# Patient Record
Sex: FEMALE | Race: OTHER
[De-identification: names, ages, dates, MRNs, and addresses within clinical notes are randomized per-mention and may not be internally consistent; named-entity substitution may affect disease eponyms.]

---

## 2019-11-06 ENCOUNTER — HOSPITAL ENCOUNTER (EMERGENCY)
Dept: HOSPITAL 56 - MW.ED | Age: 69
Discharge: HOME | End: 2019-11-06
Payer: MEDICARE
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
## 2019-11-06 DIAGNOSIS — Z79.899: ICD-10-CM

## 2019-11-06 DIAGNOSIS — M24.412: Primary | ICD-10-CM

## 2019-11-06 PROCEDURE — 73030 X-RAY EXAM OF SHOULDER: CPT

## 2019-11-06 PROCEDURE — 23650 CLTX SHO DSLC W/MNPJ WO ANES: CPT

## 2019-11-06 PROCEDURE — 99152 MOD SED SAME PHYS/QHP 5/>YRS: CPT

## 2019-11-06 PROCEDURE — 96372 THER/PROPH/DIAG INJ SC/IM: CPT

## 2019-11-06 PROCEDURE — 99284 EMERGENCY DEPT VISIT MOD MDM: CPT

## 2019-11-06 NOTE — CR
Indication:



Post reduction 



Technique:



Two views of the left shoulder were obtained.



Comparison:



November 6, 2019.



Findings:



The current studies dated 1748 hours.



The humeral head is seated within the glenoid. Degenerative changes are 

identified. No fracture is identified.



Impression:



Post reduction of the left shoulder dislocation.



Dictated by Clarisse Osullivan MD @ Nov 6 2019  6:03PM



Signed by Dr. Clarisse Osullivan @ Nov 6 2019  6:03PM

## 2019-11-06 NOTE — EDM.PDOC
ED HPI GENERAL MEDICAL PROBLEM





- General


Chief Complaint: Upper Extremity Injury/Pain


Stated Complaint: DISLOCATED SHOULDER


Time Seen by Provider: 11/06/19 15:12


Source of Information: Reports: Patient


History Limitations: Reports: No Limitations





- History of Present Illness


INITIAL COMMENTS - FREE TEXT/NARRATIVE: 


History of present illness:


[]patient has dislocated her left shoulder 7 times in the last 5 years. She has 

not followed up with orthopedic surgeon. She moved here from [place] and  was 

reaching for an object today and dislocated prior to arrival.





Review of systems: 


As per history of present illness and below otherwise all systems reviewed and 

negative.





Past medical history: 


As per history of present illness and as reviewed below otherwise 

noncontributory.





Surgical history: 


As per history of present illness and as reviewed below otherwise 

noncontributory.





Social history: 


No reported history of drug or alcohol abuse.





Family history: 


As per history of present illness and as reviewed below otherwise 

noncontributory.





Physical exam:


General: Well developed, well nourished in NAD


HEENT: Atraumatic, normocephalic, pupils reactive, negative for conjunctival 

pallor or scleral icterus, mucous membranes moist, throat clear, neck supple, 

nontender, trachea midline.


Lungs: Clear to auscultation, breath sounds equal bilaterally, chest nontender.


Heart: S1S2, regular, negative for clicks, rubs, or JVD.


Abdomen: NABS, Soft, nondistended, nontender. Negative for masses or 

hepatosplenomegaly. Negative for costovertebral tenderness.


Pelvis: Stable nontender.


Genitourinary: Deferred.


Rectal: Deferred.


Extremities: left shoulder tenderness,NVI, negative for cords or calf pain. 

Neurovascular unremarkable.


Neuro: Awake, alert, oriented. Cranial nerves II through XII unremarkable. 

Cerebellum unremarkable. Motor and sensory unremarkable throughout. Exam 

nonfocal.


Skin:warm and dry





Diagnostics:


x-ray-anterior dislocation left shoulder





Therapeutics:


dilaudid, valium, conscious sedation





ED Course:


stable





Impression: 


recurrent anterior shoulder dislocation left





Prescriptions:





Plan:


Take meds as directed, follow up with your primary care physician, return to ER 

if symptoms worsen or change.





Definitive disposition and diagnosis as appropriate pending reevaluation and 

review of above.





  ** left shoulder


Pain Score (Numeric/FACES): 9





- Related Data


 Allergies











Allergy/AdvReac Type Severity Reaction Status Date / Time


 


No Known Allergies Allergy   Verified 11/06/19 15:25











Home Meds: 


 Home Meds





Acetaminophen/Codeine [Tylenol with Codeine No.3 300MG/30MG] 1 tab PO Q4H PRN 11 /06/19 [History]


Diclofenac Sodium [Voltaren] 75 mg PO BIDMEALS PRN #20 tab.cr 11/06/19 [Rx]


Gabapentin [Neurontin] 300 mg PO BID 11/06/19 [History]


Levothyroxine [Synthroid] 88 mcg PO ACBREAKFAST 11/06/19 [History]


Omeprazole 40 mg PO DAILY 11/06/19 [History]


Sucralfate 1 gm PO BEDTIME 11/06/19 [History]


carBAMazepine [Carbamazepine ER] 200 mg PO BID 11/06/19 [History]











Review of Systems





- Review of Systems


Review Of Systems: See Below





ED EXAM, GENERAL





- Physical Exam


Exam: See Below





Course





- Vital Signs


Last Recorded V/S: 


 Last Vital Signs











Temp  98.0 F   11/06/19 15:10


 


Pulse  100   11/06/19 15:10


 


Resp  18   11/06/19 15:10


 


BP  170/89 H  11/06/19 15:10


 


Pulse Ox  99   11/06/19 15:10














- Orders/Labs/Meds


Orders: 


 Active Orders 24 hr











 Category Date Time Status


 


 Sodium Chloride 0.9% [Saline Flush] Med  11/06/19 16:00 Active





 10 ml FLUSH ASDIRECTED PRN   


 


 Sodium Chloride 0.9% [Saline Flush] Med  11/06/19 16:00 Active





 2.5 ml FLUSH ASDIRECTED PRN   


 


 Saline Lock Insert [OM.PC] Stat Oth  11/06/19 16:00 Ordered








 Medication Orders





Sodium Chloride (Saline Flush)  10 ml FLUSH ASDIRECTED PRN


   PRN Reason: Keep Vein Open


Sodium Chloride (Saline Flush)  2.5 ml FLUSH ASDIRECTED PRN


   PRN Reason: Keep Vein Open








Meds: 


Medications











Generic Name Dose Route Start Last Admin





  Trade Name Freq  PRN Reason Stop Dose Admin


 


Sodium Chloride  10 ml  11/06/19 16:00  





  Saline Flush  FLUSH   





  ASDIRECTED PRN   





  Keep Vein Open   





     





     





     


 


Sodium Chloride  2.5 ml  11/06/19 16:00  





  Saline Flush  FLUSH   





  ASDIRECTED PRN   





  Keep Vein Open   





     





     





     














Discontinued Medications














Generic Name Dose Route Start Last Admin





  Trade Name Freq  PRN Reason Stop Dose Admin


 


Diazepam  5 mg  11/06/19 15:13  11/06/19 15:27





  Valium.  PO  11/06/19 15:14  5 mg





  ONETIME ONE   Administration





     





     





     





     


 


Hydromorphone HCl  1 mg  11/06/19 15:13  11/06/19 15:26





  Dilaudid  IM  11/06/19 15:14  1 mg





  ONETIME ONE   Administration





     





     





     





     


 


Midazolam HCl  Confirm  11/06/19 16:25  





  Versed 1 Mg/Ml  Administered  11/06/19 16:26  





  Dose   





  2 mg   





  .ROUTE   





  .STK-MED ONE   





     





     





     





     


 


Ondansetron HCl  4 mg  11/06/19 15:14  11/06/19 15:27





  Zofran Odt  PO  11/06/19 15:15  4 mg





  ONETIME ONE   Administration





     





     





     





     


 


Propofol  Confirm  11/06/19 16:25  





  Diprivan  20 Ml  Administered  11/06/19 16:26  





  Dose   





  200 mg   





  .ROUTE   





  .STK-MED ONE   





     





     





     





     














Departure





- Departure


Time of Disposition: 18:21


Disposition: Home, Self-Care 01


Condition: Good


Clinical Impression: 


 Recurrent dislocation, left shoulder








- Discharge Information


*PRESCRIPTION DRUG MONITORING PROGRAM REVIEWED*: Not Applicable


*COPY OF PRESCRIPTION DRUG MONITORING REPORT IN PATIENT LUCY: Not Applicable


Referrals: 


PCP,None [Primary Care Provider] - 


Forms:  ED Department Discharge


Additional Instructions: 


The following information is given to patients seen in the emergency department 

who are being discharged to home. This information is to outline your options 

for follow-up care. We provide all patients seen in our emergency department 

with a follow-up referral.





The need for follow-up, as well as the timing and circumstances, are variable 

depending upon the specifics of your emergency department visit.





If you don't have a primary care physician on staff, we will provide you with a 

referral. We always advise you to contact your personal physician following an 

emergency department visit to inform them of the circumstance of the visit and 

for follow-up with them and/or the need for any referrals to a consulting 

specialist.





The emergency department will also refer you to a specialist when appropriate. 

This referral assures that you have the opportunity for follow-up care with a 

specialist. All of these measure are taken in an effort to provide you with 

optimal care, which includes your follow-up.





Under all circumstances we always encourage you to contact your private 

physician who remains a resource for coordinating your care. When calling for 

follow-up care, please make the office aware that this follow-up is from your 

recent emergency room visit. If for any reason you are refused follow-up, 

please contact the [facility] Emergency 

Department at [phone] and asked to speak to the emergency department 

charge nurse.





Take meds as directed, follow up with your primary care physician, return to ER 

if symptoms worsen or change.





[facility]


Primary Care


[address] [place] [id number]


Phone: [phone]


Fax: [phone]





- My Orders


Last 24 Hours: 


My Active Orders





11/06/19 16:00


Sodium Chloride 0.9% [Saline Flush]   10 ml FLUSH ASDIRECTED PRN 


Sodium Chloride 0.9% [Saline Flush]   2.5 ml FLUSH ASDIRECTED PRN 


Saline Lock Insert [OM.PC] Stat 














- Assessment/Plan


Last 24 Hours: 


My Active Orders





11/06/19 16:00


Sodium Chloride 0.9% [Saline Flush]   10 ml FLUSH ASDIRECTED PRN 


Sodium Chloride 0.9% [Saline Flush]   2.5 ml FLUSH ASDIRECTED PRN 


Saline Lock Insert [OM.PC] Stat

## 2019-11-06 NOTE — CR
EXAM DATE: 11/06/19



PATIENT'S AGE: 68





Left shoulder: Three views left shoulder were obtained.



Comparison: No previous study.



Anterior dislocation is seen.  Cystic change is noted within the glenoid.  No 
additional abnormality is seen other than osteopenia.



Impression:

1.  Dislocated left shoulder.

2.  Mild degenerative change within the glenoid.



Diagnostic code #3



Report Signed by Proxy.



HUMZA

## 2019-11-11 NOTE — PCM.PRNOTE
- Free Text/Narrative


Note: 





Anes Note





Late Entry.





Patient is in ER with a dislocated right shoulder.





Patient sedated with 30 mg propofol and 1 mg versed.





Conscious sedation achieved.





reduction tolerated well. 





Awake after procedure.





VS Stable.





Time with patient [id number]





November 6 2019

## 2022-01-15 ENCOUNTER — HOSPITAL ENCOUNTER (EMERGENCY)
Dept: HOSPITAL 90 - EDH | Age: 72
Discharge: HOME | End: 2022-01-15
Payer: COMMERCIAL

## 2022-01-15 VITALS — BODY MASS INDEX: 39.83 KG/M2 | HEIGHT: 61 IN | WEIGHT: 210.98 LBS

## 2022-01-15 VITALS — SYSTOLIC BLOOD PRESSURE: 123 MMHG | DIASTOLIC BLOOD PRESSURE: 79 MMHG

## 2022-01-15 DIAGNOSIS — Z90.49: ICD-10-CM

## 2022-01-15 DIAGNOSIS — I10: ICD-10-CM

## 2022-01-15 DIAGNOSIS — E11.9: ICD-10-CM

## 2022-01-15 DIAGNOSIS — Z98.890: ICD-10-CM

## 2022-01-15 DIAGNOSIS — K52.9: Primary | ICD-10-CM

## 2022-01-15 LAB
ALBUMIN SERPL-MCNC: 3.7 G/DL (ref 3.5–5)
ALT SERPL-CCNC: 24 U/L (ref 12–78)
AST SERPL-CCNC: 13 U/L (ref 10–37)
BASOPHILS NFR BLD AUTO: 0.1 % (ref 0–5)
BILIRUB SERPL-MCNC: 0.6 MG/DL (ref 0.2–1)
BUN SERPL-MCNC: 17 MG/DL (ref 7–18)
CHLORIDE SERPL-SCNC: 104 MMOL/L (ref 101–111)
CO2 SERPL-SCNC: 29 MMOL/L (ref 21–32)
CREAT SERPL-MCNC: 0.6 MG/DL (ref 0.5–1.5)
EOSINOPHIL NFR BLD AUTO: 0 % (ref 0–8)
ERYTHROCYTE [DISTWIDTH] IN BLOOD BY AUTOMATED COUNT: 14.1 % (ref 11–15.5)
GFR SERPL CREATININE-BSD FRML MDRD: 105 ML/MIN (ref 60–?)
GLUCOSE SERPL-MCNC: 112 MG/DL (ref 70–105)
HCT VFR BLD AUTO: 41.7 % (ref 36–48)
LIPASE SERPL-CCNC: 40 U/L (ref 114–286)
LYMPHOCYTES NFR SPEC AUTO: 15.7 % (ref 21–51)
MCH RBC QN AUTO: 29.4 PG (ref 27–33)
MCHC RBC AUTO-ENTMCNC: 32.6 G/DL (ref 32–36)
MCV RBC AUTO: 90.3 FL (ref 79–99)
MONOCYTES NFR BLD AUTO: 5.3 % (ref 3–13)
NEUTROPHILS NFR BLD AUTO: 78.5 % (ref 40–77)
NRBC BLD MANUAL-RTO: 0 % (ref 0–0.19)
PH UR STRIP: 6.5 [PH] (ref 5–8)
PLATELET # BLD AUTO: 343 K/UL (ref 130–400)
POTASSIUM SERPL-SCNC: 4.2 MMOL/L (ref 3.5–5.1)
PROT SERPL-MCNC: 7.8 G/DL (ref 6–8.3)
RBC # BLD AUTO: 4.62 MIL/UL (ref 4–5.5)
RBC #/AREA URNS HPF: (no result) /HPF (ref 0–1)
SODIUM SERPL-SCNC: 141 MMOL/L (ref 136–145)
SP GR UR STRIP: 1.01 (ref 1–1.03)
UROBILINOGEN UR STRIP-MCNC: 1 MG/DL (ref 0.2–1)
WBC # BLD AUTO: 13.9 K/UL (ref 4.8–10.8)
WBC #/AREA URNS HPF: (no result) /HPF (ref 0–1)

## 2022-01-15 PROCEDURE — 81001 URINALYSIS AUTO W/SCOPE: CPT

## 2022-01-15 PROCEDURE — 80053 COMPREHEN METABOLIC PANEL: CPT

## 2022-01-15 PROCEDURE — 36415 COLL VENOUS BLD VENIPUNCTURE: CPT

## 2022-01-15 PROCEDURE — 83690 ASSAY OF LIPASE: CPT

## 2022-01-15 PROCEDURE — 85025 COMPLETE CBC W/AUTO DIFF WBC: CPT

## 2022-01-15 PROCEDURE — 87088 URINE BACTERIA CULTURE: CPT

## 2022-05-12 ENCOUNTER — HOSPITAL ENCOUNTER (OUTPATIENT)
Dept: HOSPITAL 90 - RAH | Age: 72
Discharge: HOME | End: 2022-05-12
Attending: INTERNAL MEDICINE
Payer: COMMERCIAL

## 2022-05-12 DIAGNOSIS — R10.13: Primary | ICD-10-CM

## 2022-05-12 PROCEDURE — 78264 GASTRIC EMPTYING IMG STUDY: CPT

## 2022-07-15 ENCOUNTER — HOSPITAL ENCOUNTER (OUTPATIENT)
Dept: HOSPITAL 90 - RAH | Age: 72
Discharge: HOME | End: 2022-07-15
Attending: PHYSICAL MEDICINE & REHABILITATION
Payer: COMMERCIAL

## 2022-07-15 DIAGNOSIS — M48.061: Primary | ICD-10-CM

## 2022-07-15 DIAGNOSIS — M51.16: ICD-10-CM

## 2022-07-15 PROCEDURE — 72148 MRI LUMBAR SPINE W/O DYE: CPT

## 2022-09-30 ENCOUNTER — HOSPITAL ENCOUNTER (EMERGENCY)
Dept: HOSPITAL 90 - EDH | Age: 72
Discharge: HOME | End: 2022-09-30
Payer: COMMERCIAL

## 2022-09-30 VITALS — DIASTOLIC BLOOD PRESSURE: 69 MMHG | SYSTOLIC BLOOD PRESSURE: 109 MMHG

## 2022-09-30 VITALS — BODY MASS INDEX: 37.75 KG/M2 | HEIGHT: 61 IN | WEIGHT: 199.96 LBS

## 2022-09-30 DIAGNOSIS — Z79.899: ICD-10-CM

## 2022-09-30 DIAGNOSIS — Z90.49: ICD-10-CM

## 2022-09-30 DIAGNOSIS — Z20.822: ICD-10-CM

## 2022-09-30 DIAGNOSIS — J32.9: Primary | ICD-10-CM

## 2022-09-30 DIAGNOSIS — I10: ICD-10-CM

## 2022-09-30 PROCEDURE — 87635 SARS-COV-2 COVID-19 AMP PRB: CPT

## 2022-09-30 PROCEDURE — 96374 THER/PROPH/DIAG INJ IV PUSH: CPT

## 2022-09-30 PROCEDURE — 71045 X-RAY EXAM CHEST 1 VIEW: CPT

## 2022-09-30 PROCEDURE — 87804 INFLUENZA ASSAY W/OPTIC: CPT

## 2022-09-30 PROCEDURE — 99284 EMERGENCY DEPT VISIT MOD MDM: CPT

## 2022-12-30 ENCOUNTER — HOSPITAL ENCOUNTER (EMERGENCY)
Dept: HOSPITAL 90 - EDH | Age: 72
Discharge: HOME | End: 2022-12-30
Payer: COMMERCIAL

## 2022-12-30 VITALS — SYSTOLIC BLOOD PRESSURE: 144 MMHG | DIASTOLIC BLOOD PRESSURE: 89 MMHG

## 2022-12-30 VITALS — BODY MASS INDEX: 40.85 KG/M2 | HEIGHT: 62 IN | WEIGHT: 222.01 LBS

## 2022-12-30 DIAGNOSIS — Z79.899: ICD-10-CM

## 2022-12-30 DIAGNOSIS — Z79.1: ICD-10-CM
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
## 2022-12-30 DIAGNOSIS — S16.1XXA: Primary | ICD-10-CM

## 2022-12-30 DIAGNOSIS — I10: ICD-10-CM
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
## 2022-12-30 DIAGNOSIS — Z20.822: ICD-10-CM

## 2022-12-30 DIAGNOSIS — Z90.49: ICD-10-CM

## 2022-12-30 LAB
ALBUMIN SERPL-MCNC: 3.6 G/DL (ref 3.5–5)
ALT SERPL-CCNC: 26 U/L (ref 12–78)
APPEARANCE UR: (no result)
AST SERPL-CCNC: 22 U/L (ref 10–37)
BACTERIA URNS QL MICRO: (no result) /HPF
BASOPHILS NFR BLD AUTO: 0.3 % (ref 0–5)
BILIRUB UR QL STRIP: NEGATIVE MG/DL
BUN SERPL-MCNC: 9 MG/DL (ref 7–18)
CHLORIDE SERPL-SCNC: 104 MMOL/L (ref 101–111)
CO2 SERPL-SCNC: 29 MMOL/L (ref 21–32)
COLOR UR: (no result)
CREAT SERPL-MCNC: 0.6 MG/DL (ref 0.5–1.5)
DEPRECATED SQUAMOUS URNS QL MICRO: (no result) /HPF (ref 0–2)
EOSINOPHIL NFR BLD AUTO: 2.7 % (ref 0–8)
ERYTHROCYTE [DISTWIDTH] IN BLOOD BY AUTOMATED COUNT: 13.5 % (ref 11–15.5)
GFR SERPL CREATININE-BSD FRML MDRD: 105 ML/MIN (ref 60–?)
GLUCOSE SERPL-MCNC: 106 MG/DL (ref 70–105)
GLUCOSE UR STRIP-MCNC: NEGATIVE MG/DL
HCT VFR BLD AUTO: 41.2 % (ref 36–48)
HGB UR QL STRIP: NEGATIVE
KETONES UR STRIP-MCNC: NEGATIVE MG/DL
LEUKOCYTE ESTERASE UR QL STRIP: 500 LEU/UL
LYMPHOCYTES NFR SPEC AUTO: 25.2 % (ref 21–51)
MCH RBC QN AUTO: 29.9 PG (ref 27–33)
MCHC RBC AUTO-ENTMCNC: 32 G/DL (ref 32–36)
MCV RBC AUTO: 93.4 FL (ref 79–99)
MONOCYTES NFR BLD AUTO: 6 % (ref 3–13)
MUCOUS THREADS URNS QL MICRO: (no result) LPF
NEUTROPHILS NFR BLD AUTO: 65.5 % (ref 40–77)
NITRITE UR QL STRIP: NEGATIVE
NRBC BLD MANUAL-RTO: 0 % (ref 0–0.19)
PH UR STRIP: 7 [PH] (ref 5–8)
PLATELET # BLD AUTO: 280 K/UL (ref 130–400)
POTASSIUM SERPL-SCNC: 3.9 MMOL/L (ref 3.5–5.1)
PROT SERPL-MCNC: 7.8 G/DL (ref 6–8.3)
PROT UR QL STRIP: NEGATIVE MG/DL
RBC # BLD AUTO: 4.41 MIL/UL (ref 4–5.5)
RBC #/AREA URNS HPF: (no result) /HPF (ref 0–1)
SODIUM SERPL-SCNC: 140 MMOL/L (ref 136–145)
SP GR UR STRIP: 1.01 (ref 1–1.03)
UROBILINOGEN UR STRIP-MCNC: 0.2 MG/DL (ref 0.2–1)
WBC # BLD AUTO: 9 K/UL (ref 4.8–10.8)

## 2022-12-30 PROCEDURE — 85025 COMPLETE CBC W/AUTO DIFF WBC: CPT

## 2022-12-30 PROCEDURE — 99285 EMERGENCY DEPT VISIT HI MDM: CPT

## 2022-12-30 PROCEDURE — 87088 URINE BACTERIA CULTURE: CPT

## 2022-12-30 PROCEDURE — 87804 INFLUENZA ASSAY W/OPTIC: CPT

## 2022-12-30 PROCEDURE — 96372 THER/PROPH/DIAG INJ SC/IM: CPT

## 2022-12-30 PROCEDURE — 81001 URINALYSIS AUTO W/SCOPE: CPT

## 2022-12-30 PROCEDURE — 36415 COLL VENOUS BLD VENIPUNCTURE: CPT

## 2022-12-30 PROCEDURE — 71045 X-RAY EXAM CHEST 1 VIEW: CPT

## 2022-12-30 PROCEDURE — 84484 ASSAY OF TROPONIN QUANT: CPT

## 2022-12-30 PROCEDURE — 80053 COMPREHEN METABOLIC PANEL: CPT

## 2022-12-30 PROCEDURE — 93005 ELECTROCARDIOGRAM TRACING: CPT

## 2022-12-30 PROCEDURE — 87635 SARS-COV-2 COVID-19 AMP PRB: CPT

## 2023-04-14 ENCOUNTER — HOSPITAL ENCOUNTER (EMERGENCY)
Dept: HOSPITAL 90 - EDH | Age: 73
Discharge: HOME | End: 2023-04-14
Payer: COMMERCIAL

## 2023-04-14 VITALS — SYSTOLIC BLOOD PRESSURE: 128 MMHG | DIASTOLIC BLOOD PRESSURE: 76 MMHG

## 2023-04-14 VITALS — BODY MASS INDEX: 40.58 KG/M2 | WEIGHT: 214.95 LBS | HEIGHT: 61 IN

## 2023-04-14 DIAGNOSIS — M54.2: ICD-10-CM

## 2023-04-14 DIAGNOSIS — I10: ICD-10-CM

## 2023-04-14 DIAGNOSIS — E03.9: ICD-10-CM

## 2023-04-14 DIAGNOSIS — R51.9: Primary | ICD-10-CM

## 2023-04-14 DIAGNOSIS — Z79.899: ICD-10-CM

## 2023-04-14 PROCEDURE — 99285 EMERGENCY DEPT VISIT HI MDM: CPT

## 2023-04-14 PROCEDURE — 72125 CT NECK SPINE W/O DYE: CPT

## 2023-04-14 PROCEDURE — 70450 CT HEAD/BRAIN W/O DYE: CPT

## 2023-04-14 PROCEDURE — 96372 THER/PROPH/DIAG INJ SC/IM: CPT

## 2023-10-22 ENCOUNTER — HOSPITAL ENCOUNTER (EMERGENCY)
Dept: HOSPITAL 90 - EDH | Age: 73
Discharge: HOME | End: 2023-10-22
Payer: COMMERCIAL

## 2023-10-22 VITALS — BODY MASS INDEX: 37.75 KG/M2 | HEIGHT: 61 IN | WEIGHT: 199.96 LBS

## 2023-10-22 VITALS
SYSTOLIC BLOOD PRESSURE: 132 MMHG | HEART RATE: 102 BPM | RESPIRATION RATE: 16 BRPM | OXYGEN SATURATION: 98 % | DIASTOLIC BLOOD PRESSURE: 84 MMHG

## 2023-10-22 VITALS — RESPIRATION RATE: 18 BRPM | HEART RATE: 110 BPM

## 2023-10-22 DIAGNOSIS — Z90.49: ICD-10-CM

## 2023-10-22 DIAGNOSIS — J06.9: ICD-10-CM

## 2023-10-22 DIAGNOSIS — M19.90: ICD-10-CM

## 2023-10-22 DIAGNOSIS — J45.909: Primary | ICD-10-CM

## 2023-10-22 DIAGNOSIS — Z20.822: ICD-10-CM

## 2023-10-22 DIAGNOSIS — I10: ICD-10-CM

## 2023-10-22 LAB
BUN SERPL-MCNC: 8 MG/DL (ref 7–18)
CHLORIDE SERPL-SCNC: 107 MMOL/L (ref 101–111)
CO2 SERPL-SCNC: 25 MMOL/L (ref 21–32)
CREAT SERPL-MCNC: 0.5 MG/DL (ref 0.5–1.5)
ERYTHROCYTE [DISTWIDTH] IN BLOOD BY AUTOMATED COUNT: 14.1 % (ref 11–15.5)
GFR SERPL CREATININE-BSD FRML MDRD: 101 ML/MIN (ref 90–?)
GLUCOSE SERPL-MCNC: 103 MG/DL (ref 70–105)
HCT VFR BLD AUTO: 40.5 % (ref 36–48)
MCH RBC QN AUTO: 30.8 PG (ref 27–33)
MCHC RBC AUTO-ENTMCNC: 33.3 G/DL (ref 32–36)
MCV RBC AUTO: 92.5 FL (ref 79–99)
NRBC BLD MANUAL-RTO: 0 % (ref 0–0.19)
PLATELET # BLD AUTO: 221 K/UL (ref 130–400)
POTASSIUM SERPL-SCNC: 3 MMOL/L (ref 3.5–5.1)
RBC # BLD AUTO: 4.38 MIL/UL (ref 4–5.5)
SARS-COV-2 RNA SPEC QL NAA+PROBE: (no result)
SODIUM SERPL-SCNC: 141 MMOL/L (ref 136–145)
WBC # BLD AUTO: 7.3 K/UL (ref 4.8–10.8)

## 2023-10-22 PROCEDURE — 94640 AIRWAY INHALATION TREATMENT: CPT

## 2023-10-22 PROCEDURE — 87880 STREP A ASSAY W/OPTIC: CPT

## 2023-10-22 PROCEDURE — 36415 COLL VENOUS BLD VENIPUNCTURE: CPT

## 2023-10-22 PROCEDURE — 87635 SARS-COV-2 COVID-19 AMP PRB: CPT

## 2023-10-22 PROCEDURE — C9803 HOPD COVID-19 SPEC COLLECT: HCPCS

## 2023-10-22 PROCEDURE — 80048 BASIC METABOLIC PNL TOTAL CA: CPT

## 2023-10-22 PROCEDURE — 71045 X-RAY EXAM CHEST 1 VIEW: CPT

## 2023-10-22 PROCEDURE — 87804 INFLUENZA ASSAY W/OPTIC: CPT

## 2023-10-22 PROCEDURE — 85027 COMPLETE CBC AUTOMATED: CPT

## 2023-10-22 PROCEDURE — 99284 EMERGENCY DEPT VISIT MOD MDM: CPT

## 2023-10-23 ENCOUNTER — HOSPITAL ENCOUNTER (INPATIENT)
Dept: HOSPITAL 90 - EDH | Age: 73
LOS: 8 days | Discharge: HOME | DRG: 189 | End: 2023-10-31
Attending: INTERNAL MEDICINE | Admitting: INTERNAL MEDICINE
Payer: COMMERCIAL

## 2023-10-23 VITALS — RESPIRATION RATE: 18 BRPM | HEART RATE: 96 BPM

## 2023-10-23 VITALS — HEART RATE: 108 BPM | SYSTOLIC BLOOD PRESSURE: 173 MMHG | RESPIRATION RATE: 22 BRPM | DIASTOLIC BLOOD PRESSURE: 104 MMHG

## 2023-10-23 VITALS — RESPIRATION RATE: 22 BRPM | HEART RATE: 98 BPM

## 2023-10-23 VITALS — WEIGHT: 200.9 LBS | HEIGHT: 61 IN | BODY MASS INDEX: 37.93 KG/M2

## 2023-10-23 VITALS — HEART RATE: 96 BPM | RESPIRATION RATE: 18 BRPM | DIASTOLIC BLOOD PRESSURE: 82 MMHG | SYSTOLIC BLOOD PRESSURE: 139 MMHG

## 2023-10-23 VITALS — HEART RATE: 99 BPM | SYSTOLIC BLOOD PRESSURE: 145 MMHG | RESPIRATION RATE: 20 BRPM | DIASTOLIC BLOOD PRESSURE: 96 MMHG

## 2023-10-23 VITALS — HEART RATE: 101 BPM | RESPIRATION RATE: 22 BRPM

## 2023-10-23 VITALS — RESPIRATION RATE: 20 BRPM | SYSTOLIC BLOOD PRESSURE: 126 MMHG | DIASTOLIC BLOOD PRESSURE: 81 MMHG | HEART RATE: 98 BPM

## 2023-10-23 VITALS — OXYGEN SATURATION: 97 % | RESPIRATION RATE: 22 BRPM | HEART RATE: 93 BPM

## 2023-10-23 VITALS — RESPIRATION RATE: 22 BRPM | HEART RATE: 97 BPM

## 2023-10-23 VITALS — RESPIRATION RATE: 22 BRPM | OXYGEN SATURATION: 98 % | HEART RATE: 101 BPM

## 2023-10-23 VITALS — OXYGEN SATURATION: 2 %

## 2023-10-23 DIAGNOSIS — E66.01: ICD-10-CM

## 2023-10-23 DIAGNOSIS — G47.33: ICD-10-CM

## 2023-10-23 DIAGNOSIS — Z20.822: ICD-10-CM

## 2023-10-23 DIAGNOSIS — J96.01: Primary | ICD-10-CM

## 2023-10-23 DIAGNOSIS — I50.9: ICD-10-CM

## 2023-10-23 DIAGNOSIS — J98.11: ICD-10-CM

## 2023-10-23 DIAGNOSIS — I11.0: ICD-10-CM

## 2023-10-23 DIAGNOSIS — Z83.3: ICD-10-CM

## 2023-10-23 DIAGNOSIS — K59.00: ICD-10-CM

## 2023-10-23 DIAGNOSIS — J44.1: ICD-10-CM

## 2023-10-23 DIAGNOSIS — Z82.49: ICD-10-CM

## 2023-10-23 DIAGNOSIS — E03.9: ICD-10-CM

## 2023-10-23 DIAGNOSIS — J84.10: ICD-10-CM

## 2023-10-23 DIAGNOSIS — E44.1: ICD-10-CM

## 2023-10-23 LAB
ALBUMIN SERPL-MCNC: 3.4 G/DL (ref 3.5–5)
ALT SERPL-CCNC: 24 U/L (ref 12–78)
AST SERPL-CCNC: 33 U/L (ref 10–37)
BASE EXCESS BLDA CALC-SCNC: 0.1 MMOL/L (ref -2–3)
BASOPHILS # BLD AUTO: 0.02 K/UL (ref 0–0.2)
BASOPHILS NFR BLD AUTO: 0.3 % (ref 0–5)
BILIRUB SERPL-MCNC: 0.6 MG/DL (ref 0.2–1)
BUN SERPL-MCNC: 5 MG/DL (ref 7–18)
CHLORIDE SERPL-SCNC: 103 MMOL/L (ref 101–111)
CO2 SERPL-SCNC: 24 MMOL/L (ref 21–32)
CREAT SERPL-MCNC: 0.5 MG/DL (ref 0.5–1.5)
EOSINOPHIL # BLD AUTO: 0.03 K/UL (ref 0–0.7)
EOSINOPHIL NFR BLD AUTO: 0.4 % (ref 0–8)
ERYTHROCYTE [DISTWIDTH] IN BLOOD BY AUTOMATED COUNT: 14.6 % (ref 11–15.5)
GAS PNL BLDA: 37 CELSIUS (ref 35.5–37)
GFR SERPL CREATININE-BSD FRML MDRD: 101 ML/MIN (ref 90–?)
GLUCOSE SERPL-MCNC: 131 MG/DL (ref 70–105)
HCO3 BLDA-SCNC: 22.7 MMOL/L (ref 21–28)
HCT VFR BLD AUTO: 40.9 % (ref 36–48)
IMM GRANULOCYTES # BLD: 0.02 K/UL (ref 0–1)
LYMPHOCYTES # SPEC AUTO: 1.6 K/UL (ref 1–4.8)
LYMPHOCYTES NFR SPEC AUTO: 23.3 % (ref 21–51)
MCH RBC QN AUTO: 30.7 PG (ref 27–33)
MCHC RBC AUTO-ENTMCNC: 33.3 G/DL (ref 32–36)
MCV RBC AUTO: 92.3 FL (ref 79–99)
MONOCYTES # BLD AUTO: 0.7 K/UL (ref 0.1–1)
MONOCYTES NFR BLD AUTO: 9.4 % (ref 3–13)
NEUTROPHILS # BLD AUTO: 4.6 K/UL (ref 1.8–7.7)
NEUTROPHILS NFR BLD AUTO: 66.3 % (ref 40–77)
NRBC BLD MANUAL-RTO: 0 % (ref 0–0.19)
PCO2 BLDA: 31 MMHG (ref 32–45)
PH BLDA: 7.48 [PH] (ref 7.35–7.45)
PLATELET # BLD AUTO: 214 K/UL (ref 130–400)
PO2 BLDA: 74.6 MMHG (ref 83–108)
POTASSIUM SERPL-SCNC: 4.3 MMOL/L (ref 3.5–5.1)
PROT SERPL-MCNC: 7.4 G/DL (ref 6–8.3)
RBC # BLD AUTO: 4.43 MIL/UL (ref 4–5.5)
SAO2 % BLDA: 96 % (ref 95–99)
SARS-COV-2 RNA SPEC QL NAA+PROBE: (no result)
SODIUM SERPL-SCNC: 136 MMOL/L (ref 136–145)
VENTILATION MODE VENT: (no result)
WBC # BLD AUTO: 6.9 K/UL (ref 4.8–10.8)

## 2023-10-23 PROCEDURE — 36415 COLL VENOUS BLD VENIPUNCTURE: CPT

## 2023-10-23 PROCEDURE — 71250 CT THORAX DX C-: CPT

## 2023-10-23 PROCEDURE — 71045 X-RAY EXAM CHEST 1 VIEW: CPT

## 2023-10-23 PROCEDURE — 84484 ASSAY OF TROPONIN QUANT: CPT

## 2023-10-23 PROCEDURE — 87420 RESP SYNCYTIAL VIRUS AG IA: CPT

## 2023-10-23 PROCEDURE — 83735 ASSAY OF MAGNESIUM: CPT

## 2023-10-23 PROCEDURE — 94640 AIRWAY INHALATION TREATMENT: CPT

## 2023-10-23 PROCEDURE — 83880 ASSAY OF NATRIURETIC PEPTIDE: CPT

## 2023-10-23 PROCEDURE — 82803 BLOOD GASES ANY COMBINATION: CPT

## 2023-10-23 PROCEDURE — C9803 HOPD COVID-19 SPEC COLLECT: HCPCS

## 2023-10-23 PROCEDURE — 80048 BASIC METABOLIC PNL TOTAL CA: CPT

## 2023-10-23 PROCEDURE — 94760 N-INVAS EAR/PLS OXIMETRY 1: CPT

## 2023-10-23 PROCEDURE — 82948 REAGENT STRIP/BLOOD GLUCOSE: CPT

## 2023-10-23 PROCEDURE — 87635 SARS-COV-2 COVID-19 AMP PRB: CPT

## 2023-10-23 PROCEDURE — 94664 DEMO&/EVAL PT USE INHALER: CPT

## 2023-10-23 PROCEDURE — 94668 MNPJ CHEST WALL SBSQ: CPT

## 2023-10-23 PROCEDURE — 83036 HEMOGLOBIN GLYCOSYLATED A1C: CPT

## 2023-10-23 PROCEDURE — 87798 DETECT AGENT NOS DNA AMP: CPT

## 2023-10-23 PROCEDURE — 94010 BREATHING CAPACITY TEST: CPT

## 2023-10-23 PROCEDURE — 93005 ELECTROCARDIOGRAM TRACING: CPT

## 2023-10-23 PROCEDURE — 85025 COMPLETE CBC W/AUTO DIFF WBC: CPT

## 2023-10-23 PROCEDURE — 94667 MNPJ CHEST WALL 1ST: CPT

## 2023-10-23 PROCEDURE — 80053 COMPREHEN METABOLIC PANEL: CPT

## 2023-10-23 PROCEDURE — 85027 COMPLETE CBC AUTOMATED: CPT

## 2023-10-23 PROCEDURE — 36600 WITHDRAWAL OF ARTERIAL BLOOD: CPT

## 2023-10-23 RX ADMIN — Medication SCH MG: at 20:13

## 2023-10-23 RX ADMIN — Medication SCH MG: at 09:07

## 2023-10-23 RX ADMIN — SODIUM CHLORIDE SCH GM: 9 INJECTION, SOLUTION INTRAVENOUS at 06:47

## 2023-10-23 RX ADMIN — GUAIFENESIN AND DEXTROMETHORPHAN PRN ML: 100; 10 SYRUP ORAL at 23:07

## 2023-10-23 RX ADMIN — METHYLPREDNISOLONE SODIUM SUCCINATE SCH MG: 40 INJECTION, POWDER, FOR SOLUTION INTRAMUSCULAR; INTRAVENOUS at 09:07

## 2023-10-23 RX ADMIN — ACETAMINOPHEN PRN MG: 325 TABLET, FILM COATED ORAL at 11:18

## 2023-10-23 RX ADMIN — IPRATROPIUM BROMIDE AND ALBUTEROL SULFATE SCH UDVIAL: .5; 3 SOLUTION RESPIRATORY (INHALATION) at 19:17

## 2023-10-23 RX ADMIN — IPRATROPIUM BROMIDE AND ALBUTEROL SULFATE SCH UDVIAL: .5; 3 SOLUTION RESPIRATORY (INHALATION) at 23:37

## 2023-10-23 RX ADMIN — ENOXAPARIN SODIUM SCH MG: 40 INJECTION SUBCUTANEOUS at 09:08

## 2023-10-23 RX ADMIN — ACETAMINOPHEN PRN MG: 325 TABLET, FILM COATED ORAL at 18:23

## 2023-10-23 RX ADMIN — BUDESONIDE SCH MG: 0.5 SUSPENSION RESPIRATORY (INHALATION) at 19:17

## 2023-10-24 VITALS
OXYGEN SATURATION: 98 % | HEART RATE: 71 BPM | SYSTOLIC BLOOD PRESSURE: 146 MMHG | RESPIRATION RATE: 20 BRPM | DIASTOLIC BLOOD PRESSURE: 81 MMHG

## 2023-10-24 VITALS — RESPIRATION RATE: 20 BRPM | OXYGEN SATURATION: 95 % | HEART RATE: 106 BPM

## 2023-10-24 VITALS
OXYGEN SATURATION: 96 % | SYSTOLIC BLOOD PRESSURE: 128 MMHG | RESPIRATION RATE: 18 BRPM | HEART RATE: 91 BPM | DIASTOLIC BLOOD PRESSURE: 75 MMHG

## 2023-10-24 VITALS — DIASTOLIC BLOOD PRESSURE: 79 MMHG | HEART RATE: 65 BPM | RESPIRATION RATE: 14 BRPM | SYSTOLIC BLOOD PRESSURE: 144 MMHG

## 2023-10-24 VITALS — HEART RATE: 98 BPM | RESPIRATION RATE: 20 BRPM

## 2023-10-24 VITALS — HEART RATE: 106 BPM | RESPIRATION RATE: 20 BRPM

## 2023-10-24 VITALS — HEART RATE: 115 BPM | RESPIRATION RATE: 20 BRPM | DIASTOLIC BLOOD PRESSURE: 73 MMHG | SYSTOLIC BLOOD PRESSURE: 143 MMHG

## 2023-10-24 VITALS — RESPIRATION RATE: 18 BRPM | HEART RATE: 98 BPM | DIASTOLIC BLOOD PRESSURE: 71 MMHG | SYSTOLIC BLOOD PRESSURE: 137 MMHG

## 2023-10-24 VITALS — RESPIRATION RATE: 20 BRPM | HEART RATE: 105 BPM

## 2023-10-24 VITALS — SYSTOLIC BLOOD PRESSURE: 133 MMHG | HEART RATE: 80 BPM | RESPIRATION RATE: 20 BRPM | DIASTOLIC BLOOD PRESSURE: 84 MMHG

## 2023-10-24 VITALS — OXYGEN SATURATION: 95 %

## 2023-10-24 VITALS — HEART RATE: 104 BPM | RESPIRATION RATE: 19 BRPM

## 2023-10-24 VITALS — HEART RATE: 111 BPM | RESPIRATION RATE: 20 BRPM

## 2023-10-24 LAB
BASOPHILS # BLD AUTO: 0.01 K/UL (ref 0–0.2)
BASOPHILS NFR BLD AUTO: 0.1 % (ref 0–5)
BUN SERPL-MCNC: 8 MG/DL (ref 7–18)
CHLORIDE SERPL-SCNC: 100 MMOL/L (ref 101–111)
CO2 SERPL-SCNC: 26 MMOL/L (ref 21–32)
CREAT SERPL-MCNC: 0.7 MG/DL (ref 0.5–1.5)
EOSINOPHIL # BLD AUTO: 0 K/UL (ref 0–0.7)
EOSINOPHIL NFR BLD AUTO: 0 % (ref 0–8)
ERYTHROCYTE [DISTWIDTH] IN BLOOD BY AUTOMATED COUNT: 14.6 % (ref 11–15.5)
GFR SERPL CREATININE-BSD FRML MDRD: 93 ML/MIN (ref 90–?)
GLUCOSE SERPL-MCNC: 155 MG/DL (ref 70–105)
HBA1C MFR BLD: 5.7 % (ref 4–6)
HCT VFR BLD AUTO: 44 % (ref 36–48)
IMM GRANULOCYTES # BLD: 0.05 K/UL (ref 0–1)
LYMPHOCYTES # SPEC AUTO: 1.7 K/UL (ref 1–4.8)
LYMPHOCYTES NFR SPEC AUTO: 16.3 % (ref 21–51)
MAGNESIUM SERPL-MCNC: 1.9 MG/DL (ref 1.8–2.4)
MCH RBC QN AUTO: 30.4 PG (ref 27–33)
MCHC RBC AUTO-ENTMCNC: 31.8 G/DL (ref 32–36)
MCV RBC AUTO: 95.7 FL (ref 79–99)
MONOCYTES # BLD AUTO: 0.8 K/UL (ref 0.1–1)
MONOCYTES NFR BLD AUTO: 7.8 % (ref 3–13)
NEUTROPHILS # BLD AUTO: 8 K/UL (ref 1.8–7.7)
NEUTROPHILS NFR BLD AUTO: 75.3 % (ref 40–77)
NRBC BLD MANUAL-RTO: 0 % (ref 0–0.19)
PLATELET # BLD AUTO: 243 K/UL (ref 130–400)
POTASSIUM SERPL-SCNC: 3.8 MMOL/L (ref 3.5–5.1)
RBC # BLD AUTO: 4.6 MIL/UL (ref 4–5.5)
SODIUM SERPL-SCNC: 136 MMOL/L (ref 136–145)
WBC # BLD AUTO: 10.6 K/UL (ref 4.8–10.8)

## 2023-10-24 RX ADMIN — BUDESONIDE SCH MG: 0.5 SUSPENSION RESPIRATORY (INHALATION) at 18:57

## 2023-10-24 RX ADMIN — BENZONATATE PRN MG: 100 CAPSULE ORAL at 23:24

## 2023-10-24 RX ADMIN — ENOXAPARIN SODIUM SCH MG: 40 INJECTION SUBCUTANEOUS at 08:09

## 2023-10-24 RX ADMIN — IPRATROPIUM BROMIDE AND ALBUTEROL SULFATE SCH UDVIAL: .5; 3 SOLUTION RESPIRATORY (INHALATION) at 06:22

## 2023-10-24 RX ADMIN — Medication SCH MG: at 08:08

## 2023-10-24 RX ADMIN — SODIUM CHLORIDE SCH GM: 9 INJECTION, SOLUTION INTRAVENOUS at 05:19

## 2023-10-24 RX ADMIN — GUAIFENESIN AND DEXTROMETHORPHAN PRN ML: 100; 10 SYRUP ORAL at 17:16

## 2023-10-24 RX ADMIN — Medication SCH MG: at 20:09

## 2023-10-24 RX ADMIN — METHYLPREDNISOLONE SODIUM SUCCINATE SCH MG: 40 INJECTION, POWDER, FOR SOLUTION INTRAMUSCULAR; INTRAVENOUS at 08:09

## 2023-10-24 RX ADMIN — GUAIFENESIN AND DEXTROMETHORPHAN PRN ML: 100; 10 SYRUP ORAL at 11:14

## 2023-10-24 RX ADMIN — Medication SCH MG: at 12:00

## 2023-10-24 RX ADMIN — BUDESONIDE SCH MG: 0.5 SUSPENSION RESPIRATORY (INHALATION) at 06:22

## 2023-10-24 RX ADMIN — GUAIFENESIN AND DEXTROMETHORPHAN PRN ML: 100; 10 SYRUP ORAL at 05:19

## 2023-10-24 RX ADMIN — METHYLPREDNISOLONE SODIUM SUCCINATE SCH MG: 40 INJECTION, POWDER, FOR SOLUTION INTRAMUSCULAR; INTRAVENOUS at 17:12

## 2023-10-24 RX ADMIN — IPRATROPIUM BROMIDE AND ALBUTEROL SULFATE SCH UDVIAL: .5; 3 SOLUTION RESPIRATORY (INHALATION) at 18:57

## 2023-10-24 RX ADMIN — ACETAMINOPHEN PRN MG: 325 TABLET, FILM COATED ORAL at 08:09

## 2023-10-25 VITALS — OXYGEN SATURATION: 95 % | HEART RATE: 75 BPM | RESPIRATION RATE: 16 BRPM

## 2023-10-25 VITALS — OXYGEN SATURATION: 95 %

## 2023-10-25 VITALS — SYSTOLIC BLOOD PRESSURE: 134 MMHG | HEART RATE: 62 BPM | RESPIRATION RATE: 18 BRPM | DIASTOLIC BLOOD PRESSURE: 84 MMHG

## 2023-10-25 VITALS — HEART RATE: 68 BPM | RESPIRATION RATE: 18 BRPM

## 2023-10-25 VITALS — DIASTOLIC BLOOD PRESSURE: 60 MMHG | HEART RATE: 107 BPM | RESPIRATION RATE: 20 BRPM | SYSTOLIC BLOOD PRESSURE: 146 MMHG

## 2023-10-25 VITALS — DIASTOLIC BLOOD PRESSURE: 64 MMHG | RESPIRATION RATE: 18 BRPM | HEART RATE: 82 BPM | SYSTOLIC BLOOD PRESSURE: 134 MMHG

## 2023-10-25 VITALS — OXYGEN SATURATION: 94 % | RESPIRATION RATE: 17 BRPM | HEART RATE: 76 BPM

## 2023-10-25 VITALS — HEART RATE: 60 BPM | SYSTOLIC BLOOD PRESSURE: 138 MMHG | DIASTOLIC BLOOD PRESSURE: 88 MMHG | RESPIRATION RATE: 18 BRPM

## 2023-10-25 VITALS — HEART RATE: 76 BPM | RESPIRATION RATE: 17 BRPM

## 2023-10-25 VITALS — HEART RATE: 75 BPM | RESPIRATION RATE: 16 BRPM

## 2023-10-25 VITALS — RESPIRATION RATE: 18 BRPM | HEART RATE: 68 BPM | OXYGEN SATURATION: 94 %

## 2023-10-25 VITALS — HEART RATE: 79 BPM | RESPIRATION RATE: 18 BRPM

## 2023-10-25 VITALS — HEART RATE: 56 BPM | DIASTOLIC BLOOD PRESSURE: 81 MMHG | SYSTOLIC BLOOD PRESSURE: 135 MMHG | RESPIRATION RATE: 18 BRPM

## 2023-10-25 VITALS — DIASTOLIC BLOOD PRESSURE: 79 MMHG | HEART RATE: 89 BPM | SYSTOLIC BLOOD PRESSURE: 125 MMHG | RESPIRATION RATE: 21 BRPM

## 2023-10-25 RX ADMIN — Medication SCH MG: at 21:00

## 2023-10-25 RX ADMIN — IPRATROPIUM BROMIDE AND ALBUTEROL SULFATE SCH UDVIAL: .5; 3 SOLUTION RESPIRATORY (INHALATION) at 11:39

## 2023-10-25 RX ADMIN — Medication SCH MG: at 03:35

## 2023-10-25 RX ADMIN — ENOXAPARIN SODIUM SCH MG: 40 INJECTION SUBCUTANEOUS at 09:00

## 2023-10-25 RX ADMIN — Medication SCH MG: at 20:57

## 2023-10-25 RX ADMIN — METHYLPREDNISOLONE SODIUM SUCCINATE SCH MG: 40 INJECTION, POWDER, FOR SOLUTION INTRAMUSCULAR; INTRAVENOUS at 00:12

## 2023-10-25 RX ADMIN — Medication SCH MG: at 08:59

## 2023-10-25 RX ADMIN — BENZONATATE PRN MG: 100 CAPSULE ORAL at 18:02

## 2023-10-25 RX ADMIN — METHYLPREDNISOLONE SODIUM SUCCINATE SCH MG: 40 INJECTION, POWDER, FOR SOLUTION INTRAMUSCULAR; INTRAVENOUS at 16:49

## 2023-10-25 RX ADMIN — GUAIFENESIN AND DEXTROMETHORPHAN PRN ML: 100; 10 SYRUP ORAL at 08:59

## 2023-10-25 RX ADMIN — METHYLPREDNISOLONE SODIUM SUCCINATE SCH MG: 40 INJECTION, POWDER, FOR SOLUTION INTRAMUSCULAR; INTRAVENOUS at 08:59

## 2023-10-25 RX ADMIN — GUAIFENESIN AND DEXTROMETHORPHAN PRN ML: 100; 10 SYRUP ORAL at 18:02

## 2023-10-25 RX ADMIN — IPRATROPIUM BROMIDE AND ALBUTEROL SULFATE SCH UDVIAL: .5; 3 SOLUTION RESPIRATORY (INHALATION) at 23:28

## 2023-10-25 RX ADMIN — METOPROLOL SUCCINATE SCH MG: 50 TABLET, EXTENDED RELEASE ORAL at 20:58

## 2023-10-25 RX ADMIN — IPRATROPIUM BROMIDE AND ALBUTEROL SULFATE SCH UDVIAL: .5; 3 SOLUTION RESPIRATORY (INHALATION) at 19:13

## 2023-10-25 RX ADMIN — LOSARTAN POTASSIUM SCH MG: 50 TABLET, FILM COATED ORAL at 21:03

## 2023-10-25 RX ADMIN — BUDESONIDE SCH MG: 0.5 SUSPENSION RESPIRATORY (INHALATION) at 19:14

## 2023-10-25 RX ADMIN — IPRATROPIUM BROMIDE AND ALBUTEROL SULFATE SCH UDVIAL: .5; 3 SOLUTION RESPIRATORY (INHALATION) at 06:31

## 2023-10-25 RX ADMIN — SODIUM CHLORIDE SCH GM: 9 INJECTION, SOLUTION INTRAVENOUS at 04:35

## 2023-10-25 RX ADMIN — Medication SCH MG: at 12:00

## 2023-10-25 RX ADMIN — GUAIFENESIN AND DEXTROMETHORPHAN PRN ML: 100; 10 SYRUP ORAL at 03:22

## 2023-10-25 RX ADMIN — BENZONATATE PRN MG: 100 CAPSULE ORAL at 09:00

## 2023-10-25 RX ADMIN — BUDESONIDE SCH MG: 0.5 SUSPENSION RESPIRATORY (INHALATION) at 06:31

## 2023-10-26 VITALS — HEART RATE: 74 BPM | RESPIRATION RATE: 18 BRPM

## 2023-10-26 VITALS — HEART RATE: 84 BPM | RESPIRATION RATE: 19 BRPM

## 2023-10-26 VITALS — HEART RATE: 102 BPM | DIASTOLIC BLOOD PRESSURE: 56 MMHG | RESPIRATION RATE: 18 BRPM | SYSTOLIC BLOOD PRESSURE: 127 MMHG

## 2023-10-26 VITALS — HEART RATE: 86 BPM | RESPIRATION RATE: 20 BRPM | DIASTOLIC BLOOD PRESSURE: 61 MMHG | SYSTOLIC BLOOD PRESSURE: 122 MMHG

## 2023-10-26 VITALS — HEART RATE: 74 BPM | RESPIRATION RATE: 18 BRPM | OXYGEN SATURATION: 94 %

## 2023-10-26 VITALS — RESPIRATION RATE: 20 BRPM | SYSTOLIC BLOOD PRESSURE: 124 MMHG | DIASTOLIC BLOOD PRESSURE: 79 MMHG | HEART RATE: 88 BPM

## 2023-10-26 VITALS — RESPIRATION RATE: 20 BRPM | DIASTOLIC BLOOD PRESSURE: 72 MMHG | HEART RATE: 82 BPM | SYSTOLIC BLOOD PRESSURE: 122 MMHG

## 2023-10-26 VITALS — OXYGEN SATURATION: 93 % | RESPIRATION RATE: 19 BRPM | HEART RATE: 68 BPM

## 2023-10-26 VITALS — RESPIRATION RATE: 20 BRPM | HEART RATE: 109 BPM | SYSTOLIC BLOOD PRESSURE: 120 MMHG | DIASTOLIC BLOOD PRESSURE: 54 MMHG

## 2023-10-26 VITALS — OXYGEN SATURATION: 95 %

## 2023-10-26 VITALS — RESPIRATION RATE: 18 BRPM | HEART RATE: 88 BPM

## 2023-10-26 VITALS — RESPIRATION RATE: 19 BRPM | HEART RATE: 96 BPM

## 2023-10-26 VITALS — OXYGEN SATURATION: 92 %

## 2023-10-26 LAB
BUN SERPL-MCNC: 15 MG/DL (ref 7–18)
CHLORIDE SERPL-SCNC: 102 MMOL/L (ref 101–111)
CO2 SERPL-SCNC: 26 MMOL/L (ref 21–32)
CREAT SERPL-MCNC: 0.6 MG/DL (ref 0.5–1.5)
ERYTHROCYTE [DISTWIDTH] IN BLOOD BY AUTOMATED COUNT: 14.7 % (ref 11–15.5)
GFR SERPL CREATININE-BSD FRML MDRD: 97 ML/MIN (ref 90–?)
GLUCOSE SERPL-MCNC: 142 MG/DL (ref 70–105)
HCT VFR BLD AUTO: 42.5 % (ref 36–48)
MAGNESIUM SERPL-MCNC: 1.9 MG/DL (ref 1.8–2.4)
MCH RBC QN AUTO: 30.7 PG (ref 27–33)
MCHC RBC AUTO-ENTMCNC: 32.5 G/DL (ref 32–36)
MCV RBC AUTO: 94.4 FL (ref 79–99)
NRBC BLD MANUAL-RTO: 0 % (ref 0–0.19)
PLATELET # BLD AUTO: 275 K/UL (ref 130–400)
POTASSIUM SERPL-SCNC: 3.3 MMOL/L (ref 3.5–5.1)
RBC # BLD AUTO: 4.5 MIL/UL (ref 4–5.5)
SODIUM SERPL-SCNC: 138 MMOL/L (ref 136–145)
WBC # BLD AUTO: 11.2 K/UL (ref 4.8–10.8)

## 2023-10-26 RX ADMIN — Medication SCH MG: at 11:45

## 2023-10-26 RX ADMIN — IPRATROPIUM BROMIDE AND ALBUTEROL SULFATE SCH UDVIAL: .5; 3 SOLUTION RESPIRATORY (INHALATION) at 06:44

## 2023-10-26 RX ADMIN — METHYLPREDNISOLONE SODIUM SUCCINATE SCH MG: 40 INJECTION, POWDER, FOR SOLUTION INTRAMUSCULAR; INTRAVENOUS at 09:01

## 2023-10-26 RX ADMIN — ENOXAPARIN SODIUM SCH MG: 40 INJECTION SUBCUTANEOUS at 09:01

## 2023-10-26 RX ADMIN — IPRATROPIUM BROMIDE AND ALBUTEROL SULFATE SCH UDVIAL: .5; 3 SOLUTION RESPIRATORY (INHALATION) at 18:38

## 2023-10-26 RX ADMIN — PREDNISONE SCH MG: 20 TABLET ORAL at 21:16

## 2023-10-26 RX ADMIN — GUAIFENESIN AND DEXTROMETHORPHAN PRN ML: 100; 10 SYRUP ORAL at 09:31

## 2023-10-26 RX ADMIN — LOSARTAN POTASSIUM SCH MG: 50 TABLET, FILM COATED ORAL at 21:16

## 2023-10-26 RX ADMIN — LEVOTHYROXINE SODIUM SCH MCG: 75 TABLET ORAL at 06:52

## 2023-10-26 RX ADMIN — IPRATROPIUM BROMIDE AND ALBUTEROL SULFATE SCH UDVIAL: .5; 3 SOLUTION RESPIRATORY (INHALATION) at 23:15

## 2023-10-26 RX ADMIN — Medication SCH MG: at 21:16

## 2023-10-26 RX ADMIN — Medication SCH MG: at 09:01

## 2023-10-26 RX ADMIN — SODIUM CHLORIDE SCH GM: 9 INJECTION, SOLUTION INTRAVENOUS at 05:30

## 2023-10-26 RX ADMIN — Medication SCH MG: at 05:19

## 2023-10-26 RX ADMIN — BUDESONIDE SCH MG: 0.5 SUSPENSION RESPIRATORY (INHALATION) at 06:44

## 2023-10-26 RX ADMIN — PANTOPRAZOLE SODIUM SCH MG: 40 TABLET, DELAYED RELEASE ORAL at 09:01

## 2023-10-26 RX ADMIN — BUDESONIDE SCH MG: 0.5 SUSPENSION RESPIRATORY (INHALATION) at 18:38

## 2023-10-26 RX ADMIN — TRAMADOL HYDROCHLORIDE AND ACETAMINOPHEN PRN TAB: 37.5; 325 TABLET, FILM COATED ORAL at 18:36

## 2023-10-26 RX ADMIN — METHYLPREDNISOLONE SODIUM SUCCINATE SCH MG: 40 INJECTION, POWDER, FOR SOLUTION INTRAMUSCULAR; INTRAVENOUS at 01:19

## 2023-10-26 RX ADMIN — METOPROLOL SUCCINATE SCH MG: 50 TABLET, EXTENDED RELEASE ORAL at 21:16

## 2023-10-26 RX ADMIN — POTASSIUM CHLORIDE PRN MEQ: 1500 TABLET, EXTENDED RELEASE ORAL at 23:27

## 2023-10-26 RX ADMIN — IPRATROPIUM BROMIDE AND ALBUTEROL SULFATE SCH UDVIAL: .5; 3 SOLUTION RESPIRATORY (INHALATION) at 11:26

## 2023-10-26 RX ADMIN — LEVOTHYROXINE SODIUM SCH MCG: 75 TABLET ORAL at 06:07

## 2023-10-26 RX ADMIN — BENZONATATE PRN MG: 100 CAPSULE ORAL at 09:31

## 2023-10-26 RX ADMIN — POTASSIUM CHLORIDE PRN MEQ: 1500 TABLET, EXTENDED RELEASE ORAL at 18:10

## 2023-10-27 VITALS
HEART RATE: 66 BPM | RESPIRATION RATE: 17 BRPM | SYSTOLIC BLOOD PRESSURE: 112 MMHG | OXYGEN SATURATION: 92 % | DIASTOLIC BLOOD PRESSURE: 66 MMHG

## 2023-10-27 VITALS — DIASTOLIC BLOOD PRESSURE: 61 MMHG | RESPIRATION RATE: 17 BRPM | SYSTOLIC BLOOD PRESSURE: 115 MMHG | HEART RATE: 93 BPM

## 2023-10-27 VITALS — DIASTOLIC BLOOD PRESSURE: 63 MMHG | SYSTOLIC BLOOD PRESSURE: 118 MMHG | HEART RATE: 93 BPM | RESPIRATION RATE: 17 BRPM

## 2023-10-27 VITALS — RESPIRATION RATE: 18 BRPM | HEART RATE: 60 BPM

## 2023-10-27 VITALS — RESPIRATION RATE: 17 BRPM | HEART RATE: 61 BPM | SYSTOLIC BLOOD PRESSURE: 110 MMHG | DIASTOLIC BLOOD PRESSURE: 61 MMHG

## 2023-10-27 VITALS — RESPIRATION RATE: 16 BRPM | HEART RATE: 73 BPM | SYSTOLIC BLOOD PRESSURE: 116 MMHG | DIASTOLIC BLOOD PRESSURE: 64 MMHG

## 2023-10-27 VITALS — HEART RATE: 70 BPM | RESPIRATION RATE: 20 BRPM

## 2023-10-27 VITALS — RESPIRATION RATE: 18 BRPM | OXYGEN SATURATION: 96 % | HEART RATE: 60 BPM

## 2023-10-27 VITALS — RESPIRATION RATE: 19 BRPM | OXYGEN SATURATION: 95 % | HEART RATE: 70 BPM

## 2023-10-27 VITALS — HEART RATE: 72 BPM | RESPIRATION RATE: 19 BRPM

## 2023-10-27 VITALS — RESPIRATION RATE: 20 BRPM | DIASTOLIC BLOOD PRESSURE: 72 MMHG | HEART RATE: 65 BPM | SYSTOLIC BLOOD PRESSURE: 123 MMHG

## 2023-10-27 VITALS — HEART RATE: 63 BPM | OXYGEN SATURATION: 94 % | RESPIRATION RATE: 18 BRPM

## 2023-10-27 VITALS — OXYGEN SATURATION: 100 %

## 2023-10-27 LAB
BUN SERPL-MCNC: 15 MG/DL (ref 7–18)
CHLORIDE SERPL-SCNC: 100 MMOL/L (ref 101–111)
CO2 SERPL-SCNC: 26 MMOL/L (ref 21–32)
CREAT SERPL-MCNC: 0.7 MG/DL (ref 0.5–1.5)
ERYTHROCYTE [DISTWIDTH] IN BLOOD BY AUTOMATED COUNT: 14.8 % (ref 11–15.5)
GFR SERPL CREATININE-BSD FRML MDRD: 93 ML/MIN (ref 90–?)
GLUCOSE SERPL-MCNC: 152 MG/DL (ref 70–105)
HCT VFR BLD AUTO: 43.5 % (ref 36–48)
MCH RBC QN AUTO: 30.9 PG (ref 27–33)
MCHC RBC AUTO-ENTMCNC: 32.6 G/DL (ref 32–36)
MCV RBC AUTO: 94.8 FL (ref 79–99)
NRBC BLD MANUAL-RTO: 0 % (ref 0–0.19)
PLATELET # BLD AUTO: 284 K/UL (ref 130–400)
POTASSIUM SERPL-SCNC: 3.7 MMOL/L (ref 3.5–5.1)
RBC # BLD AUTO: 4.59 MIL/UL (ref 4–5.5)
SODIUM SERPL-SCNC: 134 MMOL/L (ref 136–145)
WBC # BLD AUTO: 10.5 K/UL (ref 4.8–10.8)

## 2023-10-27 RX ADMIN — IPRATROPIUM BROMIDE AND ALBUTEROL SULFATE SCH UDVIAL: .5; 3 SOLUTION RESPIRATORY (INHALATION) at 23:30

## 2023-10-27 RX ADMIN — Medication SCH MG: at 20:37

## 2023-10-27 RX ADMIN — PREDNISONE SCH MG: 20 TABLET ORAL at 20:37

## 2023-10-27 RX ADMIN — LEVOTHYROXINE SODIUM SCH MCG: 75 TABLET ORAL at 06:07

## 2023-10-27 RX ADMIN — Medication SCH MG: at 09:19

## 2023-10-27 RX ADMIN — Medication SCH MG: at 06:07

## 2023-10-27 RX ADMIN — BUDESONIDE SCH MG: 0.5 SUSPENSION RESPIRATORY (INHALATION) at 06:53

## 2023-10-27 RX ADMIN — METOPROLOL SUCCINATE SCH MG: 50 TABLET, EXTENDED RELEASE ORAL at 20:37

## 2023-10-27 RX ADMIN — Medication SCH MG: at 11:51

## 2023-10-27 RX ADMIN — ENOXAPARIN SODIUM SCH MG: 40 INJECTION SUBCUTANEOUS at 09:17

## 2023-10-27 RX ADMIN — LOSARTAN POTASSIUM SCH MG: 50 TABLET, FILM COATED ORAL at 20:40

## 2023-10-27 RX ADMIN — WATER SCH GM: 1 INJECTION INTRAVENOUS at 20:37

## 2023-10-27 RX ADMIN — IPRATROPIUM BROMIDE AND ALBUTEROL SULFATE SCH UDVIAL: .5; 3 SOLUTION RESPIRATORY (INHALATION) at 11:30

## 2023-10-27 RX ADMIN — PANTOPRAZOLE SODIUM SCH MG: 40 TABLET, DELAYED RELEASE ORAL at 09:18

## 2023-10-27 RX ADMIN — IPRATROPIUM BROMIDE AND ALBUTEROL SULFATE SCH UDVIAL: .5; 3 SOLUTION RESPIRATORY (INHALATION) at 18:23

## 2023-10-27 RX ADMIN — BUDESONIDE SCH MG: 0.5 SUSPENSION RESPIRATORY (INHALATION) at 18:23

## 2023-10-27 RX ADMIN — IPRATROPIUM BROMIDE AND ALBUTEROL SULFATE SCH UDVIAL: .5; 3 SOLUTION RESPIRATORY (INHALATION) at 06:53

## 2023-10-27 RX ADMIN — SODIUM CHLORIDE SCH GM: 9 INJECTION, SOLUTION INTRAVENOUS at 06:07

## 2023-10-27 RX ADMIN — PREDNISONE SCH MG: 20 TABLET ORAL at 09:18

## 2023-10-28 VITALS — RESPIRATION RATE: 18 BRPM | DIASTOLIC BLOOD PRESSURE: 73 MMHG | HEART RATE: 93 BPM | SYSTOLIC BLOOD PRESSURE: 116 MMHG

## 2023-10-28 VITALS — HEART RATE: 80 BPM | OXYGEN SATURATION: 96 % | RESPIRATION RATE: 20 BRPM

## 2023-10-28 VITALS
RESPIRATION RATE: 17 BRPM | HEART RATE: 77 BPM | DIASTOLIC BLOOD PRESSURE: 70 MMHG | OXYGEN SATURATION: 96 % | SYSTOLIC BLOOD PRESSURE: 114 MMHG

## 2023-10-28 VITALS — RESPIRATION RATE: 18 BRPM | SYSTOLIC BLOOD PRESSURE: 123 MMHG | HEART RATE: 73 BPM | DIASTOLIC BLOOD PRESSURE: 75 MMHG

## 2023-10-28 VITALS — DIASTOLIC BLOOD PRESSURE: 72 MMHG | RESPIRATION RATE: 17 BRPM | HEART RATE: 60 BPM | SYSTOLIC BLOOD PRESSURE: 107 MMHG

## 2023-10-28 VITALS — OXYGEN SATURATION: 95 % | RESPIRATION RATE: 18 BRPM | HEART RATE: 77 BPM

## 2023-10-28 VITALS — RESPIRATION RATE: 18 BRPM | OXYGEN SATURATION: 95 % | HEART RATE: 60 BPM

## 2023-10-28 VITALS — HEART RATE: 67 BPM | SYSTOLIC BLOOD PRESSURE: 116 MMHG | RESPIRATION RATE: 17 BRPM | DIASTOLIC BLOOD PRESSURE: 88 MMHG

## 2023-10-28 VITALS — DIASTOLIC BLOOD PRESSURE: 70 MMHG | RESPIRATION RATE: 20 BRPM | HEART RATE: 62 BPM | SYSTOLIC BLOOD PRESSURE: 125 MMHG

## 2023-10-28 VITALS
HEART RATE: 62 BPM | DIASTOLIC BLOOD PRESSURE: 52 MMHG | OXYGEN SATURATION: 96 % | SYSTOLIC BLOOD PRESSURE: 134 MMHG | RESPIRATION RATE: 18 BRPM

## 2023-10-28 VITALS — HEART RATE: 61 BPM | OXYGEN SATURATION: 93 % | RESPIRATION RATE: 18 BRPM

## 2023-10-28 VITALS — HEART RATE: 70 BPM | RESPIRATION RATE: 20 BRPM

## 2023-10-28 VITALS — HEART RATE: 69 BPM | RESPIRATION RATE: 16 BRPM

## 2023-10-28 VITALS — OXYGEN SATURATION: 96 %

## 2023-10-28 RX ADMIN — Medication SCH MG: at 12:17

## 2023-10-28 RX ADMIN — WATER SCH GM: 1 INJECTION INTRAVENOUS at 14:30

## 2023-10-28 RX ADMIN — IPRATROPIUM BROMIDE AND ALBUTEROL SULFATE SCH UDVIAL: .5; 3 SOLUTION RESPIRATORY (INHALATION) at 18:48

## 2023-10-28 RX ADMIN — PREDNISONE SCH MG: 20 TABLET ORAL at 20:33

## 2023-10-28 RX ADMIN — BENZONATATE PRN MG: 100 CAPSULE ORAL at 03:38

## 2023-10-28 RX ADMIN — WATER SCH GM: 1 INJECTION INTRAVENOUS at 20:34

## 2023-10-28 RX ADMIN — Medication SCH MG: at 03:38

## 2023-10-28 RX ADMIN — BUDESONIDE SCH MG: 0.5 SUSPENSION RESPIRATORY (INHALATION) at 06:37

## 2023-10-28 RX ADMIN — LEVOTHYROXINE SODIUM SCH MCG: 75 TABLET ORAL at 05:59

## 2023-10-28 RX ADMIN — WATER SCH GM: 1 INJECTION INTRAVENOUS at 01:15

## 2023-10-28 RX ADMIN — SODIUM CHLORIDE SCH GM: 9 INJECTION, SOLUTION INTRAVENOUS at 05:59

## 2023-10-28 RX ADMIN — ENOXAPARIN SODIUM SCH MG: 40 INJECTION SUBCUTANEOUS at 08:28

## 2023-10-28 RX ADMIN — BUDESONIDE SCH MG: 0.5 SUSPENSION RESPIRATORY (INHALATION) at 19:05

## 2023-10-28 RX ADMIN — Medication SCH MG: at 20:33

## 2023-10-28 RX ADMIN — METOPROLOL SUCCINATE SCH MG: 50 TABLET, EXTENDED RELEASE ORAL at 20:32

## 2023-10-28 RX ADMIN — TRAMADOL HYDROCHLORIDE AND ACETAMINOPHEN PRN TAB: 37.5; 325 TABLET, FILM COATED ORAL at 10:39

## 2023-10-28 RX ADMIN — WATER SCH GM: 1 INJECTION INTRAVENOUS at 08:27

## 2023-10-28 RX ADMIN — IPRATROPIUM BROMIDE AND ALBUTEROL SULFATE SCH UDVIAL: .5; 3 SOLUTION RESPIRATORY (INHALATION) at 23:47

## 2023-10-28 RX ADMIN — PREDNISONE SCH MG: 20 TABLET ORAL at 08:28

## 2023-10-28 RX ADMIN — IPRATROPIUM BROMIDE AND ALBUTEROL SULFATE SCH UDVIAL: .5; 3 SOLUTION RESPIRATORY (INHALATION) at 06:37

## 2023-10-28 RX ADMIN — Medication SCH MG: at 08:28

## 2023-10-28 RX ADMIN — PANTOPRAZOLE SODIUM SCH MG: 40 TABLET, DELAYED RELEASE ORAL at 08:28

## 2023-10-28 RX ADMIN — LOSARTAN POTASSIUM SCH MG: 50 TABLET, FILM COATED ORAL at 20:32

## 2023-10-28 RX ADMIN — BENZONATATE PRN MG: 100 CAPSULE ORAL at 20:32

## 2023-10-28 RX ADMIN — IPRATROPIUM BROMIDE AND ALBUTEROL SULFATE SCH UDVIAL: .5; 3 SOLUTION RESPIRATORY (INHALATION) at 10:58

## 2023-10-29 VITALS — HEART RATE: 60 BPM | OXYGEN SATURATION: 97 % | RESPIRATION RATE: 18 BRPM

## 2023-10-29 VITALS — DIASTOLIC BLOOD PRESSURE: 70 MMHG | RESPIRATION RATE: 18 BRPM | SYSTOLIC BLOOD PRESSURE: 127 MMHG | HEART RATE: 65 BPM

## 2023-10-29 VITALS — OXYGEN SATURATION: 97 %

## 2023-10-29 VITALS — RESPIRATION RATE: 20 BRPM | HEART RATE: 90 BPM

## 2023-10-29 VITALS — RESPIRATION RATE: 17 BRPM | HEART RATE: 70 BPM | SYSTOLIC BLOOD PRESSURE: 113 MMHG | DIASTOLIC BLOOD PRESSURE: 74 MMHG

## 2023-10-29 VITALS — DIASTOLIC BLOOD PRESSURE: 46 MMHG | RESPIRATION RATE: 17 BRPM | SYSTOLIC BLOOD PRESSURE: 131 MMHG | HEART RATE: 70 BPM

## 2023-10-29 VITALS — HEART RATE: 93 BPM | RESPIRATION RATE: 17 BRPM | DIASTOLIC BLOOD PRESSURE: 69 MMHG | SYSTOLIC BLOOD PRESSURE: 107 MMHG

## 2023-10-29 VITALS — RESPIRATION RATE: 18 BRPM | HEART RATE: 62 BPM | OXYGEN SATURATION: 94 %

## 2023-10-29 VITALS — RESPIRATION RATE: 18 BRPM | HEART RATE: 60 BPM

## 2023-10-29 VITALS — RESPIRATION RATE: 17 BRPM | SYSTOLIC BLOOD PRESSURE: 131 MMHG | HEART RATE: 80 BPM | DIASTOLIC BLOOD PRESSURE: 65 MMHG

## 2023-10-29 VITALS — HEART RATE: 84 BPM | RESPIRATION RATE: 19 BRPM

## 2023-10-29 VITALS — RESPIRATION RATE: 18 BRPM | OXYGEN SATURATION: 95 % | HEART RATE: 91 BPM

## 2023-10-29 VITALS — RESPIRATION RATE: 18 BRPM | HEART RATE: 62 BPM

## 2023-10-29 VITALS — OXYGEN SATURATION: 93 %

## 2023-10-29 RX ADMIN — IPRATROPIUM BROMIDE AND ALBUTEROL SULFATE SCH UDVIAL: .5; 3 SOLUTION RESPIRATORY (INHALATION) at 11:09

## 2023-10-29 RX ADMIN — LOSARTAN POTASSIUM SCH MG: 50 TABLET, FILM COATED ORAL at 19:50

## 2023-10-29 RX ADMIN — Medication SCH MG: at 19:49

## 2023-10-29 RX ADMIN — ENOXAPARIN SODIUM SCH MG: 40 INJECTION SUBCUTANEOUS at 09:26

## 2023-10-29 RX ADMIN — BENZONATATE PRN MG: 100 CAPSULE ORAL at 19:49

## 2023-10-29 RX ADMIN — IPRATROPIUM BROMIDE AND ALBUTEROL SULFATE SCH UDVIAL: .5; 3 SOLUTION RESPIRATORY (INHALATION) at 23:45

## 2023-10-29 RX ADMIN — Medication SCH MG: at 11:59

## 2023-10-29 RX ADMIN — Medication SCH MG: at 09:26

## 2023-10-29 RX ADMIN — IPRATROPIUM BROMIDE AND ALBUTEROL SULFATE SCH UDVIAL: .5; 3 SOLUTION RESPIRATORY (INHALATION) at 06:29

## 2023-10-29 RX ADMIN — BENZONATATE PRN MG: 100 CAPSULE ORAL at 06:08

## 2023-10-29 RX ADMIN — PREDNISONE SCH MG: 20 TABLET ORAL at 09:26

## 2023-10-29 RX ADMIN — IPRATROPIUM BROMIDE AND ALBUTEROL SULFATE SCH UDVIAL: .5; 3 SOLUTION RESPIRATORY (INHALATION) at 19:03

## 2023-10-29 RX ADMIN — BUDESONIDE SCH MG: 0.5 SUSPENSION RESPIRATORY (INHALATION) at 06:29

## 2023-10-29 RX ADMIN — LEVOTHYROXINE SODIUM SCH MCG: 75 TABLET ORAL at 06:05

## 2023-10-29 RX ADMIN — SODIUM CHLORIDE SCH GM: 9 INJECTION, SOLUTION INTRAVENOUS at 06:05

## 2023-10-29 RX ADMIN — METOPROLOL SUCCINATE SCH MG: 50 TABLET, EXTENDED RELEASE ORAL at 19:50

## 2023-10-29 RX ADMIN — PREDNISONE SCH MG: 20 TABLET ORAL at 19:49

## 2023-10-29 RX ADMIN — BUDESONIDE SCH MG: 0.5 SUSPENSION RESPIRATORY (INHALATION) at 19:37

## 2023-10-29 RX ADMIN — Medication SCH MG: at 04:24

## 2023-10-29 RX ADMIN — PANTOPRAZOLE SODIUM SCH MG: 40 TABLET, DELAYED RELEASE ORAL at 09:26

## 2023-10-30 VITALS — HEART RATE: 93 BPM | OXYGEN SATURATION: 93 % | RESPIRATION RATE: 18 BRPM

## 2023-10-30 VITALS — DIASTOLIC BLOOD PRESSURE: 74 MMHG | SYSTOLIC BLOOD PRESSURE: 121 MMHG | HEART RATE: 60 BPM | RESPIRATION RATE: 18 BRPM

## 2023-10-30 VITALS — HEART RATE: 61 BPM | SYSTOLIC BLOOD PRESSURE: 118 MMHG | DIASTOLIC BLOOD PRESSURE: 60 MMHG | RESPIRATION RATE: 18 BRPM

## 2023-10-30 VITALS — OXYGEN SATURATION: 94 %

## 2023-10-30 VITALS — OXYGEN SATURATION: 95 % | RESPIRATION RATE: 16 BRPM | HEART RATE: 99 BPM

## 2023-10-30 VITALS — HEART RATE: 73 BPM | SYSTOLIC BLOOD PRESSURE: 108 MMHG | DIASTOLIC BLOOD PRESSURE: 59 MMHG | RESPIRATION RATE: 18 BRPM

## 2023-10-30 VITALS — SYSTOLIC BLOOD PRESSURE: 113 MMHG | DIASTOLIC BLOOD PRESSURE: 80 MMHG | HEART RATE: 78 BPM | RESPIRATION RATE: 18 BRPM

## 2023-10-30 VITALS — HEART RATE: 99 BPM | RESPIRATION RATE: 16 BRPM

## 2023-10-30 VITALS — SYSTOLIC BLOOD PRESSURE: 122 MMHG | DIASTOLIC BLOOD PRESSURE: 55 MMHG | HEART RATE: 59 BPM | RESPIRATION RATE: 18 BRPM

## 2023-10-30 VITALS — HEART RATE: 88 BPM | RESPIRATION RATE: 18 BRPM

## 2023-10-30 VITALS — DIASTOLIC BLOOD PRESSURE: 67 MMHG | SYSTOLIC BLOOD PRESSURE: 93 MMHG | RESPIRATION RATE: 18 BRPM | HEART RATE: 68 BPM

## 2023-10-30 VITALS — RESPIRATION RATE: 18 BRPM | HEART RATE: 82 BPM

## 2023-10-30 VITALS — RESPIRATION RATE: 18 BRPM | HEART RATE: 93 BPM

## 2023-10-30 VITALS — HEART RATE: 100 BPM | RESPIRATION RATE: 16 BRPM

## 2023-10-30 LAB
ALBUMIN SERPL-MCNC: 2.7 G/DL (ref 3.5–5)
ALT SERPL-CCNC: 26 U/L (ref 12–78)
AST SERPL-CCNC: 11 U/L (ref 10–37)
BASOPHILS # BLD AUTO: 0.03 K/UL (ref 0–0.2)
BASOPHILS NFR BLD AUTO: 0.3 % (ref 0–5)
BILIRUB SERPL-MCNC: 0.5 MG/DL (ref 0.2–1)
BUN SERPL-MCNC: 14 MG/DL (ref 7–18)
CHLORIDE SERPL-SCNC: 102 MMOL/L (ref 101–111)
CO2 SERPL-SCNC: 30 MMOL/L (ref 21–32)
CREAT SERPL-MCNC: 0.7 MG/DL (ref 0.5–1.5)
EOSINOPHIL # BLD AUTO: 0 K/UL (ref 0–0.7)
EOSINOPHIL NFR BLD AUTO: 0 % (ref 0–8)
ERYTHROCYTE [DISTWIDTH] IN BLOOD BY AUTOMATED COUNT: 14.8 % (ref 11–15.5)
GFR SERPL CREATININE-BSD FRML MDRD: 93 ML/MIN (ref 90–?)
GLUCOSE SERPL-MCNC: 132 MG/DL (ref 70–105)
HCT VFR BLD AUTO: 44 % (ref 36–48)
IMM GRANULOCYTES # BLD: 0.2 K/UL (ref 0–1)
LYMPHOCYTES # SPEC AUTO: 1.8 K/UL (ref 1–4.8)
LYMPHOCYTES NFR SPEC AUTO: 16.5 % (ref 21–51)
MCH RBC QN AUTO: 30.4 PG (ref 27–33)
MCHC RBC AUTO-ENTMCNC: 31.8 G/DL (ref 32–36)
MCV RBC AUTO: 95.4 FL (ref 79–99)
MONOCYTES # BLD AUTO: 0.6 K/UL (ref 0.1–1)
MONOCYTES NFR BLD AUTO: 5.4 % (ref 3–13)
NEUTROPHILS # BLD AUTO: 8.3 K/UL (ref 1.8–7.7)
NEUTROPHILS NFR BLD AUTO: 76 % (ref 40–77)
NRBC BLD MANUAL-RTO: 0 % (ref 0–0.19)
PLATELET # BLD AUTO: 288 K/UL (ref 130–400)
POTASSIUM SERPL-SCNC: 4.3 MMOL/L (ref 3.5–5.1)
PROT SERPL-MCNC: 6 G/DL (ref 6–8.3)
RBC # BLD AUTO: 4.61 MIL/UL (ref 4–5.5)
SODIUM SERPL-SCNC: 136 MMOL/L (ref 136–145)
WBC # BLD AUTO: 10.9 K/UL (ref 4.8–10.8)

## 2023-10-30 RX ADMIN — SODIUM CHLORIDE SCH GM: 9 INJECTION, SOLUTION INTRAVENOUS at 05:17

## 2023-10-30 RX ADMIN — PREDNISONE SCH MG: 20 TABLET ORAL at 09:06

## 2023-10-30 RX ADMIN — IPRATROPIUM BROMIDE AND ALBUTEROL SULFATE SCH UDVIAL: .5; 3 SOLUTION RESPIRATORY (INHALATION) at 18:24

## 2023-10-30 RX ADMIN — IPRATROPIUM BROMIDE AND ALBUTEROL SULFATE SCH UDVIAL: .5; 3 SOLUTION RESPIRATORY (INHALATION) at 11:27

## 2023-10-30 RX ADMIN — BUDESONIDE SCH MG: 0.5 SUSPENSION RESPIRATORY (INHALATION) at 18:24

## 2023-10-30 RX ADMIN — LOSARTAN POTASSIUM SCH MG: 50 TABLET, FILM COATED ORAL at 20:48

## 2023-10-30 RX ADMIN — IPRATROPIUM BROMIDE AND ALBUTEROL SULFATE SCH UDVIAL: .5; 3 SOLUTION RESPIRATORY (INHALATION) at 06:30

## 2023-10-30 RX ADMIN — Medication SCH MG: at 20:48

## 2023-10-30 RX ADMIN — TRAMADOL HYDROCHLORIDE AND ACETAMINOPHEN PRN TAB: 37.5; 325 TABLET, FILM COATED ORAL at 09:06

## 2023-10-30 RX ADMIN — PANTOPRAZOLE SODIUM SCH MG: 40 TABLET, DELAYED RELEASE ORAL at 09:07

## 2023-10-30 RX ADMIN — BUDESONIDE SCH MG: 0.5 SUSPENSION RESPIRATORY (INHALATION) at 06:30

## 2023-10-30 RX ADMIN — METOPROLOL SUCCINATE SCH MG: 50 TABLET, EXTENDED RELEASE ORAL at 20:48

## 2023-10-30 RX ADMIN — IPRATROPIUM BROMIDE AND ALBUTEROL SULFATE SCH UDVIAL: .5; 3 SOLUTION RESPIRATORY (INHALATION) at 23:30

## 2023-10-30 RX ADMIN — LEVOTHYROXINE SODIUM SCH MCG: 75 TABLET ORAL at 05:17

## 2023-10-30 RX ADMIN — PREDNISONE SCH MG: 20 TABLET ORAL at 20:48

## 2023-10-30 RX ADMIN — Medication SCH MG: at 12:30

## 2023-10-30 RX ADMIN — ENOXAPARIN SODIUM SCH MG: 40 INJECTION SUBCUTANEOUS at 09:08

## 2023-10-30 RX ADMIN — BENZONATATE PRN MG: 100 CAPSULE ORAL at 03:25

## 2023-10-30 RX ADMIN — Medication SCH MG: at 03:25

## 2023-10-30 RX ADMIN — Medication SCH MG: at 09:06

## 2023-10-31 VITALS — OXYGEN SATURATION: 97 %

## 2023-10-31 VITALS — SYSTOLIC BLOOD PRESSURE: 137 MMHG | DIASTOLIC BLOOD PRESSURE: 69 MMHG | RESPIRATION RATE: 18 BRPM | HEART RATE: 52 BPM

## 2023-10-31 VITALS — HEART RATE: 84 BPM | DIASTOLIC BLOOD PRESSURE: 51 MMHG | SYSTOLIC BLOOD PRESSURE: 122 MMHG | RESPIRATION RATE: 18 BRPM

## 2023-10-31 VITALS — RESPIRATION RATE: 18 BRPM | SYSTOLIC BLOOD PRESSURE: 142 MMHG | HEART RATE: 105 BPM | DIASTOLIC BLOOD PRESSURE: 62 MMHG

## 2023-10-31 VITALS — HEART RATE: 92 BPM | RESPIRATION RATE: 20 BRPM

## 2023-10-31 VITALS — RESPIRATION RATE: 18 BRPM | DIASTOLIC BLOOD PRESSURE: 52 MMHG | HEART RATE: 80 BPM | SYSTOLIC BLOOD PRESSURE: 117 MMHG

## 2023-10-31 VITALS — RESPIRATION RATE: 20 BRPM | HEART RATE: 52 BPM

## 2023-10-31 VITALS — RESPIRATION RATE: 18 BRPM | DIASTOLIC BLOOD PRESSURE: 58 MMHG | SYSTOLIC BLOOD PRESSURE: 129 MMHG | HEART RATE: 78 BPM

## 2023-10-31 VITALS — HEART RATE: 50 BPM | RESPIRATION RATE: 20 BRPM | OXYGEN SATURATION: 94 %

## 2023-10-31 VITALS — OXYGEN SATURATION: 94 %

## 2023-10-31 VITALS — RESPIRATION RATE: 20 BRPM | HEART RATE: 50 BPM

## 2023-10-31 RX ADMIN — ENOXAPARIN SODIUM SCH MG: 40 INJECTION SUBCUTANEOUS at 08:10

## 2023-10-31 RX ADMIN — IPRATROPIUM BROMIDE AND ALBUTEROL SULFATE SCH UDVIAL: .5; 3 SOLUTION RESPIRATORY (INHALATION) at 11:15

## 2023-10-31 RX ADMIN — Medication SCH MG: at 12:44

## 2023-10-31 RX ADMIN — BUDESONIDE SCH MG: 0.5 SUSPENSION RESPIRATORY (INHALATION) at 06:16

## 2023-10-31 RX ADMIN — PREDNISONE SCH MG: 20 TABLET ORAL at 08:09

## 2023-10-31 RX ADMIN — SODIUM CHLORIDE SCH GM: 9 INJECTION, SOLUTION INTRAVENOUS at 06:10

## 2023-10-31 RX ADMIN — Medication SCH MG: at 08:09

## 2023-10-31 RX ADMIN — PANTOPRAZOLE SODIUM SCH MG: 40 TABLET, DELAYED RELEASE ORAL at 08:09

## 2023-10-31 RX ADMIN — LEVOTHYROXINE SODIUM SCH MCG: 75 TABLET ORAL at 06:10

## 2023-10-31 RX ADMIN — Medication SCH MG: at 04:56

## 2023-10-31 RX ADMIN — IPRATROPIUM BROMIDE AND ALBUTEROL SULFATE SCH UDVIAL: .5; 3 SOLUTION RESPIRATORY (INHALATION) at 06:16

## 2023-12-15 ENCOUNTER — HOSPITAL ENCOUNTER (EMERGENCY)
Dept: HOSPITAL 90 - EDH | Age: 73
Discharge: HOME | End: 2023-12-15
Payer: COMMERCIAL

## 2023-12-15 VITALS
RESPIRATION RATE: 16 BRPM | DIASTOLIC BLOOD PRESSURE: 71 MMHG | HEART RATE: 102 BPM | SYSTOLIC BLOOD PRESSURE: 112 MMHG | OXYGEN SATURATION: 97 %

## 2023-12-15 VITALS — BODY MASS INDEX: 36.84 KG/M2 | HEIGHT: 61 IN | WEIGHT: 195.11 LBS

## 2023-12-15 DIAGNOSIS — I50.9: ICD-10-CM

## 2023-12-15 DIAGNOSIS — E03.9: ICD-10-CM

## 2023-12-15 DIAGNOSIS — R06.00: ICD-10-CM

## 2023-12-15 DIAGNOSIS — E83.42: ICD-10-CM

## 2023-12-15 DIAGNOSIS — J44.9: ICD-10-CM

## 2023-12-15 DIAGNOSIS — I48.91: Primary | ICD-10-CM

## 2023-12-15 DIAGNOSIS — Z98.890: ICD-10-CM

## 2023-12-15 DIAGNOSIS — E87.6: ICD-10-CM

## 2023-12-15 DIAGNOSIS — Z90.49: ICD-10-CM

## 2023-12-15 DIAGNOSIS — I11.0: ICD-10-CM

## 2023-12-15 DIAGNOSIS — Z79.899: ICD-10-CM

## 2023-12-15 LAB
ALBUMIN SERPL-MCNC: 3.1 G/DL (ref 3.5–5)
ALT SERPL-CCNC: 22 U/L (ref 12–78)
AST SERPL-CCNC: 14 U/L (ref 10–37)
BILIRUB SERPL-MCNC: 0.8 MG/DL (ref 0.2–1)
BUN SERPL-MCNC: 2 MG/DL (ref 7–18)
CHLORIDE SERPL-SCNC: 108 MMOL/L (ref 101–111)
CO2 SERPL-SCNC: 26 MMOL/L (ref 21–32)
CREAT SERPL-MCNC: 0.6 MG/DL (ref 0.5–1.5)
ERYTHROCYTE [DISTWIDTH] IN BLOOD BY AUTOMATED COUNT: 15.2 % (ref 11–15.5)
GFR SERPL CREATININE-BSD FRML MDRD: 96 ML/MIN (ref 90–?)
GLUCOSE SERPL-MCNC: 129 MG/DL (ref 70–105)
HCT VFR BLD AUTO: 39.6 % (ref 36–48)
MCH RBC QN AUTO: 31.5 PG (ref 27–33)
MCHC RBC AUTO-ENTMCNC: 32.8 G/DL (ref 32–36)
MCV RBC AUTO: 95.9 FL (ref 79–99)
NRBC BLD MANUAL-RTO: 0 % (ref 0–0.19)
PLATELET # BLD AUTO: 257 K/UL (ref 130–400)
POTASSIUM SERPL-SCNC: 3.1 MMOL/L (ref 3.5–5.1)
PROT SERPL-MCNC: 6.9 G/DL (ref 6–8.3)
RBC # BLD AUTO: 4.13 MIL/UL (ref 4–5.5)
SODIUM SERPL-SCNC: 145 MMOL/L (ref 136–145)
WBC # BLD AUTO: 8.2 K/UL (ref 4.8–10.8)

## 2023-12-15 PROCEDURE — 36415 COLL VENOUS BLD VENIPUNCTURE: CPT

## 2023-12-15 PROCEDURE — 85027 COMPLETE CBC AUTOMATED: CPT

## 2023-12-15 PROCEDURE — 83735 ASSAY OF MAGNESIUM: CPT

## 2023-12-15 PROCEDURE — 99285 EMERGENCY DEPT VISIT HI MDM: CPT

## 2023-12-15 PROCEDURE — 71045 X-RAY EXAM CHEST 1 VIEW: CPT

## 2023-12-15 PROCEDURE — 83880 ASSAY OF NATRIURETIC PEPTIDE: CPT

## 2023-12-15 PROCEDURE — 93005 ELECTROCARDIOGRAM TRACING: CPT

## 2023-12-15 PROCEDURE — 84484 ASSAY OF TROPONIN QUANT: CPT

## 2023-12-15 PROCEDURE — 80053 COMPREHEN METABOLIC PANEL: CPT

## 2023-12-15 PROCEDURE — 96365 THER/PROPH/DIAG IV INF INIT: CPT

## 2023-12-15 PROCEDURE — 96375 TX/PRO/DX INJ NEW DRUG ADDON: CPT

## 2023-12-15 PROCEDURE — 93306 TTE W/DOPPLER COMPLETE: CPT

## 2023-12-15 PROCEDURE — 96366 THER/PROPH/DIAG IV INF ADDON: CPT

## 2023-12-27 ENCOUNTER — HOSPITAL ENCOUNTER (OUTPATIENT)
Dept: HOSPITAL 90 - LAB | Age: 73
Discharge: HOME | End: 2023-12-27
Attending: PHYSICIAN ASSISTANT
Payer: COMMERCIAL

## 2023-12-27 DIAGNOSIS — I48.0: Primary | ICD-10-CM

## 2023-12-27 DIAGNOSIS — R00.0: ICD-10-CM

## 2023-12-27 LAB
ALBUMIN SERPL-MCNC: 3.5 G/DL (ref 3.5–5)
ALT SERPL-CCNC: 20 U/L (ref 12–78)
AST SERPL-CCNC: 19 U/L (ref 10–37)
BASOPHILS # BLD AUTO: 0.04 K/UL (ref 0–0.2)
BASOPHILS NFR BLD AUTO: 0.4 % (ref 0–5)
BILIRUB SERPL-MCNC: 0.7 MG/DL (ref 0.2–1)
BUN SERPL-MCNC: 8 MG/DL (ref 7–18)
CHLORIDE SERPL-SCNC: 105 MMOL/L (ref 101–111)
CO2 SERPL-SCNC: 25 MMOL/L (ref 21–32)
CREAT SERPL-MCNC: 0.5 MG/DL (ref 0.5–1.5)
DIGOXIN SERPL-MCNC: 0.51 NG/ML (ref 0.5–2)
EOSINOPHIL # BLD AUTO: 0.21 K/UL (ref 0–0.7)
EOSINOPHIL NFR BLD AUTO: 2.3 % (ref 0–8)
ERYTHROCYTE [DISTWIDTH] IN BLOOD BY AUTOMATED COUNT: 14.4 % (ref 11–15.5)
GFR SERPL CREATININE-BSD FRML MDRD: 100 ML/MIN (ref 90–?)
GLUCOSE SERPL-MCNC: 103 MG/DL (ref 70–105)
HCT VFR BLD AUTO: 44.1 % (ref 36–48)
IMM GRANULOCYTES # BLD: 0.06 K/UL (ref 0–1)
LYMPHOCYTES # SPEC AUTO: 2.8 K/UL (ref 1–4.8)
LYMPHOCYTES NFR SPEC AUTO: 31.2 % (ref 21–51)
MAGNESIUM SERPL-MCNC: 1.8 MG/DL (ref 1.8–2.4)
MCH RBC QN AUTO: 31.5 PG (ref 27–33)
MCHC RBC AUTO-ENTMCNC: 31.7 G/DL (ref 32–36)
MCV RBC AUTO: 99.1 FL (ref 79–99)
MONOCYTES # BLD AUTO: 0.8 K/UL (ref 0.1–1)
MONOCYTES NFR BLD AUTO: 8.8 % (ref 3–13)
NEUTROPHILS # BLD AUTO: 5.1 K/UL (ref 1.8–7.7)
NEUTROPHILS NFR BLD AUTO: 56.6 % (ref 40–77)
NRBC BLD MANUAL-RTO: 0 % (ref 0–0.19)
PLATELET # BLD AUTO: 309 K/UL (ref 130–400)
POTASSIUM SERPL-SCNC: 4.2 MMOL/L (ref 3.5–5.1)
PROT SERPL-MCNC: 7.1 G/DL (ref 6–8.3)
RBC # BLD AUTO: 4.45 MIL/UL (ref 4–5.5)
SODIUM SERPL-SCNC: 142 MMOL/L (ref 136–145)
WBC # BLD AUTO: 9.1 K/UL (ref 4.8–10.8)

## 2023-12-27 PROCEDURE — 85025 COMPLETE CBC W/AUTO DIFF WBC: CPT

## 2023-12-27 PROCEDURE — 80053 COMPREHEN METABOLIC PANEL: CPT

## 2023-12-27 PROCEDURE — 83735 ASSAY OF MAGNESIUM: CPT

## 2023-12-27 PROCEDURE — 80162 ASSAY OF DIGOXIN TOTAL: CPT

## 2023-12-27 PROCEDURE — 36415 COLL VENOUS BLD VENIPUNCTURE: CPT

## 2024-01-11 ENCOUNTER — HOSPITAL ENCOUNTER (OUTPATIENT)
Dept: HOSPITAL 90 - LAB | Age: 74
Discharge: HOME | End: 2024-01-11
Attending: PHYSICIAN ASSISTANT
Payer: COMMERCIAL

## 2024-01-11 DIAGNOSIS — I25.10: Primary | ICD-10-CM

## 2024-01-11 LAB
BUN SERPL-MCNC: 12 MG/DL (ref 7–18)
CHLORIDE SERPL-SCNC: 102 MMOL/L (ref 101–111)
CO2 SERPL-SCNC: 28 MMOL/L (ref 21–32)
CREAT SERPL-MCNC: 0.7 MG/DL (ref 0.5–1.5)
DIGOXIN SERPL-MCNC: 0.78 NG/ML (ref 0.5–2)
GFR SERPL CREATININE-BSD FRML MDRD: 92 ML/MIN (ref 90–?)
GLUCOSE SERPL-MCNC: 110 MG/DL (ref 70–105)
MAGNESIUM SERPL-MCNC: 1.9 MG/DL (ref 1.8–2.4)
POTASSIUM SERPL-SCNC: 3.9 MMOL/L (ref 3.5–5.1)
SODIUM SERPL-SCNC: 139 MMOL/L (ref 136–145)

## 2024-01-11 PROCEDURE — 83735 ASSAY OF MAGNESIUM: CPT

## 2024-01-11 PROCEDURE — 80162 ASSAY OF DIGOXIN TOTAL: CPT

## 2024-01-11 PROCEDURE — 36415 COLL VENOUS BLD VENIPUNCTURE: CPT

## 2024-01-11 PROCEDURE — 80048 BASIC METABOLIC PNL TOTAL CA: CPT

## 2024-08-29 ENCOUNTER — HOSPITAL ENCOUNTER (EMERGENCY)
Dept: HOSPITAL 90 - EDH | Age: 74
Discharge: HOME | End: 2024-08-29
Payer: COMMERCIAL

## 2024-08-29 VITALS — WEIGHT: 175.93 LBS | BODY MASS INDEX: 33.22 KG/M2 | HEIGHT: 61 IN

## 2024-08-29 VITALS
SYSTOLIC BLOOD PRESSURE: 112 MMHG | HEART RATE: 84 BPM | RESPIRATION RATE: 18 BRPM | OXYGEN SATURATION: 97 % | DIASTOLIC BLOOD PRESSURE: 73 MMHG

## 2024-08-29 DIAGNOSIS — R73.9: Primary | ICD-10-CM

## 2024-08-29 DIAGNOSIS — Z79.899: ICD-10-CM

## 2024-08-29 DIAGNOSIS — I10: ICD-10-CM

## 2024-08-29 LAB
BASOPHILS # BLD AUTO: 0.03 K/UL (ref 0–0.2)
BASOPHILS NFR BLD AUTO: 0.3 % (ref 0–5)
BUN SERPL-MCNC: 10 MG/DL (ref 7–18)
CHLORIDE SERPL-SCNC: 104 MMOL/L (ref 101–111)
CO2 SERPL-SCNC: 26 MMOL/L (ref 21–32)
CREAT SERPL-MCNC: 0.6 MG/DL (ref 0.5–1)
EOSINOPHIL # BLD AUTO: 0.14 K/UL (ref 0–0.7)
EOSINOPHIL NFR BLD AUTO: 1.3 % (ref 0–8)
ERYTHROCYTE [DISTWIDTH] IN BLOOD BY AUTOMATED COUNT: 14.2 % (ref 11–15.5)
GFR SERPL CREATININE-BSD FRML MDRD: 96 ML/MIN (ref 90–?)
GLUCOSE SERPL-MCNC: 100 MG/DL (ref 70–105)
HCT VFR BLD AUTO: 41.2 % (ref 36–48)
IMM GRANULOCYTES # BLD: 0.04 K/UL (ref 0–1)
LYMPHOCYTES # SPEC AUTO: 3.1 K/UL (ref 1–4.8)
LYMPHOCYTES NFR SPEC AUTO: 29.4 % (ref 21–51)
MCH RBC QN AUTO: 32.5 PG (ref 27–33)
MCHC RBC AUTO-ENTMCNC: 33.3 G/DL (ref 32–36)
MCV RBC AUTO: 97.6 FL (ref 79–99)
MONOCYTES # BLD AUTO: 0.8 K/UL (ref 0.1–1)
MONOCYTES NFR BLD AUTO: 7.1 % (ref 3–13)
NEUTROPHILS # BLD AUTO: 6.6 K/UL (ref 1.8–7.7)
NEUTROPHILS NFR BLD AUTO: 61.5 % (ref 40–77)
NRBC BLD MANUAL-RTO: 0 % (ref 0–0.19)
PLATELET # BLD AUTO: 279 K/UL (ref 130–400)
POTASSIUM SERPL-SCNC: 3.8 MMOL/L (ref 3.5–5.1)
RBC # BLD AUTO: 4.22 MIL/UL (ref 4–5.5)
SODIUM SERPL-SCNC: 138 MMOL/L (ref 136–145)
WBC # BLD AUTO: 10.7 K/UL (ref 4.8–10.8)

## 2024-08-29 PROCEDURE — 85025 COMPLETE CBC W/AUTO DIFF WBC: CPT

## 2024-08-29 PROCEDURE — 82948 REAGENT STRIP/BLOOD GLUCOSE: CPT

## 2024-08-29 PROCEDURE — 80048 BASIC METABOLIC PNL TOTAL CA: CPT

## 2024-08-29 PROCEDURE — 36415 COLL VENOUS BLD VENIPUNCTURE: CPT

## 2024-11-23 ENCOUNTER — HOSPITAL ENCOUNTER (EMERGENCY)
Dept: HOSPITAL 90 - EDH | Age: 74
Discharge: HOME | End: 2024-11-23
Payer: COMMERCIAL

## 2024-11-23 VITALS
TEMPERATURE: 98.2 F | RESPIRATION RATE: 18 BRPM | DIASTOLIC BLOOD PRESSURE: 81 MMHG | HEART RATE: 88 BPM | SYSTOLIC BLOOD PRESSURE: 132 MMHG

## 2024-11-23 VITALS — WEIGHT: 175.05 LBS | BODY MASS INDEX: 34.37 KG/M2 | HEIGHT: 60 IN

## 2024-11-23 DIAGNOSIS — S50.812A: Primary | ICD-10-CM

## 2024-11-23 DIAGNOSIS — Z79.899: ICD-10-CM

## 2024-11-23 DIAGNOSIS — J44.9: ICD-10-CM

## 2024-11-23 DIAGNOSIS — Z90.49: ICD-10-CM
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
## 2024-11-23 DIAGNOSIS — Z79.01: ICD-10-CM
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
## 2024-11-23 DIAGNOSIS — I10: ICD-10-CM
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
## 2024-11-23 DIAGNOSIS — E11.9: ICD-10-CM

## 2024-11-23 PROCEDURE — 99282 EMERGENCY DEPT VISIT SF MDM: CPT

## 2025-03-02 ENCOUNTER — HOSPITAL ENCOUNTER (EMERGENCY)
Dept: HOSPITAL 90 - EDH | Age: 75
Discharge: HOME | End: 2025-03-02
Payer: COMMERCIAL

## 2025-03-02 VITALS
DIASTOLIC BLOOD PRESSURE: 81 MMHG | SYSTOLIC BLOOD PRESSURE: 121 MMHG | HEART RATE: 95 BPM | TEMPERATURE: 98.1 F | RESPIRATION RATE: 20 BRPM | OXYGEN SATURATION: 98 %

## 2025-03-02 VITALS — WEIGHT: 175.05 LBS | BODY MASS INDEX: 39.38 KG/M2 | HEIGHT: 56 IN

## 2025-03-02 DIAGNOSIS — Z90.49: ICD-10-CM

## 2025-03-02 DIAGNOSIS — Z20.822: ICD-10-CM

## 2025-03-02 DIAGNOSIS — J02.0: ICD-10-CM

## 2025-03-02 DIAGNOSIS — E03.9: ICD-10-CM

## 2025-03-02 DIAGNOSIS — Z79.01: ICD-10-CM

## 2025-03-02 DIAGNOSIS — J10.1: ICD-10-CM

## 2025-03-02 DIAGNOSIS — E83.42: Primary | ICD-10-CM

## 2025-03-02 DIAGNOSIS — I48.91: ICD-10-CM

## 2025-03-02 DIAGNOSIS — Z79.899: ICD-10-CM

## 2025-03-02 LAB
APPEARANCE UR: CLEAR
APTT PPP: 31 SEC (ref 26.3–35.5)
BACTERIA URNS QL MICRO: (no result) /HPF
BASOPHILS # BLD AUTO: 0.03 K/UL (ref 0–0.2)
BASOPHILS NFR BLD AUTO: 0.2 % (ref 0–5)
BILIRUB UR QL STRIP: NEGATIVE MG/DL
BUN SERPL-MCNC: 8 MG/DL (ref 7–18)
CHLORIDE SERPL-SCNC: 96 MMOL/L (ref 101–111)
CK SERPL-CCNC: 35 U/L (ref 21–232)
CO2 SERPL-SCNC: 31 MMOL/L (ref 21–32)
COLOR UR: (no result)
CREAT SERPL-MCNC: 0.5 MG/DL (ref 0.5–1)
DEPRECATED SQUAMOUS URNS QL MICRO: (no result) /HPF (ref 0–2)
EOSINOPHIL # BLD AUTO: 0.09 K/UL (ref 0–0.7)
EOSINOPHIL NFR BLD AUTO: 0.7 % (ref 0–8)
ERYTHROCYTE [DISTWIDTH] IN BLOOD BY AUTOMATED COUNT: 12.2 % (ref 11–15.5)
GFR SERPL CREATININE-BSD FRML MDRD: 100 ML/MIN (ref 90–?)
GLUCOSE SERPL-MCNC: 99 MG/DL (ref 70–105)
GLUCOSE UR STRIP-MCNC: NEGATIVE MG/DL
HCT VFR BLD AUTO: 44.4 % (ref 36–48)
HGB UR QL STRIP: NEGATIVE
IMM GRANULOCYTES # BLD: 0.11 K/UL (ref 0–1)
INR PPP: 1.03 (ref 0.85–1.15)
KETONES UR STRIP-MCNC: NEGATIVE MG/DL
LEUKOCYTE ESTERASE UR QL STRIP: 75 LEU/UL
LYMPHOCYTES # SPEC AUTO: 1.1 K/UL (ref 1–4.8)
LYMPHOCYTES NFR SPEC AUTO: 9.1 % (ref 21–51)
MAGNESIUM SERPL-MCNC: 1.7 MG/DL (ref 1.8–2.4)
MCH RBC QN AUTO: 31.5 PG (ref 27–33)
MCHC RBC AUTO-ENTMCNC: 32.2 G/DL (ref 32–36)
MCV RBC AUTO: 97.8 FL (ref 79–99)
MICRO URNS: YES
MONOCYTES # BLD AUTO: 1.3 K/UL (ref 0.1–1)
MONOCYTES NFR BLD AUTO: 10.7 % (ref 3–13)
MUCOUS THREADS URNS QL MICRO: (no result) LPF
NEUTROPHILS # BLD AUTO: 9.5 K/UL (ref 1.8–7.7)
NEUTROPHILS NFR BLD AUTO: 78.4 % (ref 40–77)
NITRITE UR QL STRIP: NEGATIVE
NRBC BLD MANUAL-RTO: 0 % (ref 0–0.19)
PH UR STRIP: 7.5 [PH] (ref 5–8)
PLATELET # BLD AUTO: 266 K/UL (ref 130–400)
POTASSIUM SERPL-SCNC: 3.7 MMOL/L (ref 3.5–5.1)
PROT UR QL STRIP: NEGATIVE MG/DL
PROTHROMBIN TIME: 10.9 SEC (ref 9.6–11.6)
RBC # BLD AUTO: 4.54 MIL/UL (ref 4–5.5)
RBC #/AREA URNS HPF: (no result) /HPF (ref 0–1)
SARS-COV-2 RNA SPEC QL NAA+PROBE: (no result)
SODIUM SERPL-SCNC: 134 MMOL/L (ref 136–145)
SP GR UR STRIP: 1.01 (ref 1–1.03)
UROBILINOGEN UR STRIP-MCNC: 0.2 MG/DL (ref 0.2–1)
WBC # BLD AUTO: 12.1 K/UL (ref 4.8–10.8)
WBC #/AREA URNS HPF: (no result) /HPF (ref 0–1)
WBC MORPH BLD: (no result)

## 2025-03-02 PROCEDURE — 87880 STREP A ASSAY W/OPTIC: CPT

## 2025-03-02 PROCEDURE — 83880 ASSAY OF NATRIURETIC PEPTIDE: CPT

## 2025-03-02 PROCEDURE — 83735 ASSAY OF MAGNESIUM: CPT

## 2025-03-02 PROCEDURE — 71045 X-RAY EXAM CHEST 1 VIEW: CPT

## 2025-03-02 PROCEDURE — 96375 TX/PRO/DX INJ NEW DRUG ADDON: CPT

## 2025-03-02 PROCEDURE — 84484 ASSAY OF TROPONIN QUANT: CPT

## 2025-03-02 PROCEDURE — 96365 THER/PROPH/DIAG IV INF INIT: CPT

## 2025-03-02 PROCEDURE — 85610 PROTHROMBIN TIME: CPT

## 2025-03-02 PROCEDURE — 99285 EMERGENCY DEPT VISIT HI MDM: CPT

## 2025-03-02 PROCEDURE — 87804 INFLUENZA ASSAY W/OPTIC: CPT

## 2025-03-02 PROCEDURE — 81001 URINALYSIS AUTO W/SCOPE: CPT

## 2025-03-02 PROCEDURE — 82550 ASSAY OF CK (CPK): CPT

## 2025-03-02 PROCEDURE — 96366 THER/PROPH/DIAG IV INF ADDON: CPT

## 2025-03-02 PROCEDURE — 85025 COMPLETE CBC W/AUTO DIFF WBC: CPT

## 2025-03-02 PROCEDURE — 85730 THROMBOPLASTIN TIME PARTIAL: CPT

## 2025-03-02 PROCEDURE — 80048 BASIC METABOLIC PNL TOTAL CA: CPT

## 2025-03-02 PROCEDURE — 36415 COLL VENOUS BLD VENIPUNCTURE: CPT

## 2025-03-02 PROCEDURE — 93005 ELECTROCARDIOGRAM TRACING: CPT

## 2025-03-02 PROCEDURE — 87635 SARS-COV-2 COVID-19 AMP PRB: CPT

## 2025-03-02 PROCEDURE — 87086 URINE CULTURE/COLONY COUNT: CPT

## 2025-03-02 RX ADMIN — MAGNESIUM SULFATE IN WATER STA MLS/HR: 40 INJECTION, SOLUTION INTRAVENOUS at 15:06

## 2025-03-02 RX ADMIN — SODIUM CHLORIDE ONE MLS/HR: 0.9 INJECTION, SOLUTION INTRAVENOUS at 15:06

## 2025-03-02 NOTE — [facility]
PORTABLE CHEST RADIOGRAPH



INDICATION: sob



COMPARISON: 12/15/2023



FINDINGS: Cardiac monitor leads overlie the field of view. 



Heart remains enlarged. The pulmonary vascularity and criss appear

normal. 



No abnormal pulmonary parenchymal opacity or consolidation identified. 



No significant pleural effusion noted. No pneumothorax detected. 





IMPRESSION:



Cardiomegaly without radiographic evidence for any acute cardiopulmonary

process.

## 2025-03-02 NOTE — EKG
[facility]

                                       

Test Date:    2025               Test Time:    14:15:55

Pat Name:     RALPH GUERRA           Department:   ED

Patient ID:   [id number]           Room:          

Gender:       F                        Technician:   [id number]

:          1950               Requested By: ARSEN CHURCHILL

Order Number: [id number]        Reading MD:   Twyla Oliva

                                 Measurements

Intervals                              Axis          

Rate:         97                       P:            0

CT:           0                        QRS:          -40

QRSD:         90                       T:            99

QT:           321                                    

QTc:          408                                    

                           Interpretive Statements

Atrial fibrillation

LAD, consider left anterior fascicular block

Low voltage, precordial leads

Consider anterior infarct

Compared to ECG 2024 13:37:25

Low QRS voltage now present

Left-axis deviation no longer present

Myocardial infarct finding still present

Electronically Signed On 2025 15:40:27 CST by Twyla Oliva



Please click the below link to view image of tracing.

## 2025-03-02 NOTE — ERN
General


Chief Complaint:  Cough


Stated Complaint:  FLU LIKE SYMPTOMS


Time Seen by MD:  13:26


Source:  patient





History of Present Illness


Initial Comments


Patient is a 74-year-old female coming in to be evaluated for URI symptoms.  

Patient does have a history of atrial fibrillation.  Per patient the symptoms 

began three days ago states he came in for further evaluation.


Allergies:  


Coded Allergies:  


     No Known Drug Allergies (Unverified  Allergy, Unknown, 18)


Home Meds


Active Scripts


Oseltamivir Phosphate (Tamiflu) 75 Mg Cap, 1 CAP PO BID for 5 Days, #10 CAP 0 

Refills


   Prov:ARSEN CHURCHILL MD         3/2/25


Amoxicillin (Amoxicillin) 500 Mg Capsule, 1 CAP PO TID for 10 Days, #30 CAP 0 

Refills


   Prov:ARSEN CHURCHILL MD         3/2/25


Albuterol Sulfate (Ventolin Hfa/Proventil Hfa/Proair Hfa) 90 Mcg Puff, 1-2 PUFF 

IH Q4H PRN for SHORTNESS OF BREATH for 5 Days, #1 INH 0 Refills


   PHARMACY TO DISPENSE 1 INHALER FOR USE


   Prov:JAQUAN RICO MD         10/22/23


Reported Medications


Metoprolol Succinate (Metoprolol Succinate) 25 Mg Tab.er.24h, 25 MG PO TID, TAB


   4/15/24


Apixaban (Eliquis) 5 Mg Tablet, 5 MG PO BID, TAB


   4/15/24


Digoxin (Digoxin) 0.125/2.5 Solution, 0.125 MG PO DAILY, ML


   4/15/24


Montelukast Sodium (Montelukast Sodium) 10 Mg Tablet, 10 MG PO DAILY, TAB


   4/15/24


Magnesium Oxide (Magnesium Oxide) 400 Mg Magnesium Tablet, 400 MG PO BID, TAB


   4/15/24


Diclofenac Sodium (Diclofenac Sodium) 75 Mg Tablet.dr, 75 MG PO BIDAC, TAB


   4/15/24


Desloratadine (Desloratadine) 5 Mg Tab.rapdis, 5 MG PO DAILY, TAB


   4/15/24


Potassium Chloride (Potassium Chloride) 10 Meq Tab.er.prt, 10 MEQ PO BID


   4/15/24


Tizanidine HCl (Tizanidine HCl) 2 Mg Tablet, 2 MG PO Q8HPRN, TAB


   4/15/24


Losartan Potassium (Losartan Potassium) 25 Mg Tablet, 25 MG PO TID, TAB


   4/15/24


Levothyroxine Sodium (Levothyroxine Sodium) 88 Mcg Tablet, 88 MCG PO DAILYBKFST,

TAB


   4/15/24


Pantoprazole Sodium (Pantoprazole Sodium) 40 Mg Tablet.dr, 1 TAB PO DAILY


   10/25/23


Tramadol HCl/Acetaminophen (Tramadol-Acetaminophn 37.5-325) 37.5 Mg-325 Mg 

Tablet, 1 TAB PO Q8HPRN PRN for PAIN LEVEL 1 TO 5


   10/25/23





Past Medical History


Past Medical History:  A-Fib, Hypertension, Hypothyroid


Medical History Other:  THYROID, HEADACHES


Past Surgical History:  Cholecystectomy





Family History


Family History:  CAD, DM, HTN





Social History


Social History:  Negative, Lives with family, Other





Female(pregnancy History)


Pregnancy History:  Not Applicable





ROS Dictation


CONSTITUTIONAL:  No chills, no fever, no weakness, no diaphoresis, no malaise.


HEAD/FACE:  No signs of trauma. 


EENT:  No eye pain, no blurred vision, no tearing, no double vision, no ear 

pain, no ear discharge, no nose pain, no nasal congestion, no throat pain, no 

throat swelling, no mouth pain. 


RESPIRATORY:  No cough, no orthopnea, no SOB, no stridor, no wheezing. 


CARDIOVASCULAR:  No chest pain, no edema, no palpitations, no syncope. 


GASTROINTESTINAL/ABDOMINAL:   No abdominal pain, no constipation, no diarrhea, 

no nausea, no vomiting. 


GENITOURINARY:  No abnormal discharge, no dysuria, no frequent urination, no 

hematuria. No complaints of pain in the genitals. 


MUSCULOSKELETAL:  No back pain, no gout, no joint pain, no joint swelling, no 

muscle pain, no muscle stiffness, no neck pain. 


INTEGUMENTARY:  No change in color, no change in hair/nails, no dryness, no 

lesion, no lumps, no rash. 


NEUROLOGICAL/PSYCH:  No anxiety, not depressed, no emotional problem, no 

headache, no numbness, no pre-existing deficit, no history of seizures,  no 

tremors, no weakness. 


HEMATOLOGIC/LYMPHATIC:  Not anemic, no history of blood clots, no apparent 

bleeding, no bruising, glands not swollen.


All Systems Negative, Except as Noted.





Physical Exam


Physical Exam Dictation


VITAL SIGNS:  Reviewed. 


GENERAL APPEARANCE:  Alert, oriented x3, no acute distress, obese.


HEAD AND FACE: Non-traumatic. 


EYES:   PERRL, pink conjunctivas, eyelid no trauma, anterior chamber clear. 


EARS:  Pinnas intact and no signs of trauma or erythema.  Ear canals clear and 

no discharge. TMs no erythema. 


NOSE:  No discharge, no bleeding. 


OROPHARYNX:  Mouth normal, teeth no caries, tongue pink.  Pharynx clear, no 

erythema.  Tonsils no exudates, no abscesses noted. Mucous membrane moist.


NECK:  Supple, non-tender, no thyromegaly, no masses, no JVD, no bruits. 


BREAST:  Deferred.


CHEST:   No tenderness, no crepitus, no paradoxical movement, no retractions.


LUNGS:  Clear, well-ventilated, symmetric, no rales, no wheezing, no rhonchi, no

stridor, good breath sounds bilaterally. 


HEART:  Regular rate, regular rhythm, no murmur, no gallops. 


VASCULAR: No peripheral edema.


ABDOMEN: Soft, positive bowel sounds, nondistended, no guarding, nontender, no 

rebound, no masses no hepatomegaly, no splenomegaly, no Smith's sign, no 

hernias. 


RECTAL: Deferred. 


GENITAL:  Deferred. 


NEUROLOGICAL:  Normal speech, gross motor function intact, gross sensory 

function intact.


MUSCULOSKELETAL:  Neck nontender, full range of motion, back nontender, full 

range of motion.


EXTREMITIES: Nontender, full range of motion. 


SKIN:  Color pink, dry, no turgor, no rash, no lacerations, no abrasions, no 

contusions.


LYMPHATICS:  Deferred.





Results


Laboratory and Microbiology


Lab and Micro Result





Laboratory Tests








Test


 3/2/25


13:45 3/2/25


13:53 3/2/25


16:03


 


Influenza Type A Antigen


 Positive For


Type A 


 





 


Influenza Type B Antigen


 Negative For


Type B 


 





 


SARS-CoV-2, RNA, NAAT


 NEGATIVE SARS


CoV-2 


 





 


Group A Streptococcus Rapid


 positive


(NEGATIVE)  *A 


 





 


White Blood Count


 


 12.1 K/uL


(4.8-10.8)  H 





 


Red Blood Count


 


 4.54 MIL/uL


(4.00-5.50) 





 


Hemoglobin


 


 14.3 g/dL


(12.0-16.0) 





 


Hematocrit


 


 44.4 % (36-48)


 





 


Mean Corpuscular Volume


 


 97.8 fL


(79-99) 





 


Mean Corpuscular Hemoglobin


 


 31.5 pg


(27.0-33.0) 





 


Mean Corpuscular Hemoglobin


Concent 


 32.2 g/dL


(32.0-36.0) 





 


Red Cell Distribution Width


 


 12.2 %


(11.0-15.5) 





 


Platelet Count


 


 266 K/uL


(130-400) 





 


Mean Platelet Volume


 


 10.2 fL


(7.5-10.5) 





 


Immature Granulocyte % (Auto)  0.9 % (0-1)   


 


Neutrophils (%) (Auto)


 


 78.4 %


(40.0-77.0)  H 





 


Lymphocytes (%) (Auto)


 


 9.1 %


(21.0-51.0)  L 





 


Monocytes (%) (Auto)


 


 10.7 %


(3.0-13.0) 





 


Eosinophils (%) (Auto)


 


 0.7 %


(0.0-8.0) 





 


Basophils (%) (Auto)


 


 0.2 %


(0.0-5.0) 





 


Neutrophils # (Auto)


 


 9.5 K/uL


(1.8-7.7)  H 





 


Lymphocytes # (Auto)


 


 1.1 K/uL


(1.0-4.8) 





 


Monocytes # (Auto)


 


 1.3 K/uL


(0.1-1.0)  H 





 


Eosinophils # (Auto)


 


 0.09 K/uL


(0.00-0.70) 





 


Basophils # (Auto)


 


 0.03 K/uL


(0.00-0.20) 





 


Absolute Immature Granulocyte


(auto 


 0.11 K/uL


(0-1) 





 


Nucleated Red Blood Cells


 


 0.0 %


(0.0-0.19) 





 


White Cell Morphology Comment  See comments   


 


Prothrombin Time


 


 10.9 SEC


(9.6-11.6) 





 


Prothromb Time International


Ratio 


 1.03


(0.85-1.15) 





 


Activated Partial


Thromboplast Time 


 31.0 SEC


(26.3-35.5) 





 


Sodium Level


 


 134 mmol/L


(136-145)  L 





 


Potassium Level


 


 3.7 mmol/L


(3.5-5.1) 





 


Chloride Level


 


 96 mmol/L


(101-111)  L 





 


Carbon Dioxide Level


 


 31 mmol/L


(21-32) 





 


Blood Urea Nitrogen


 


 8 mg/dL (7-18)


 





 


Creatinine


 


 0.5 mg/dL


(0.5-1.0) 





 


Glomerular Filtration Rate


Calc 


 100 mL/min


(>90) 





 


Random Glucose


 


 99 mg/dL


() 





 


Total Calcium


 


 8.8 mg/dL


(8.5-10.1) 





 


Magnesium Level


 


 1.70 mg/dL


(1.80-2.40)  L 





 


Total Creatine Kinase


 


 35 U/L


()  # 





 


Troponin I High Sensitivity  5 ng/L (4-50)   


 


B-Type Natriuretic Peptide


 


 86 pg/mL


(0-100) 





 


Urine Color


 


 


 LIGHT-YELLOW


(YELLOW)


 


Urine Appearance   CLEAR (CLEAR)  


 


Urine pH   7.5 (5.0-8.0)  


 


Urine Specific Gravity


 


 


 1.012


(1.001-1.031)


 


Urine Protein


 


 


 NEGATIVE mg/dL


(NEGATIVE)


 


Urine Glucose (UA)


 


 


 NEGATIVE mg/dL


(NEGATIVE)


 


Urine Ketones


 


 


 NEGATIVE mg/dL


(NEGATIVE)


 


Urine Occult Blood


 


 


 NEGATIVE


(NEGATIVE)


 


Urine Nitrate


 


 


 NEGATIVE


(NEGATIVE)


 


Urine Bilirubin


 


 


 NEGATIVE mg/dL


(NEGATIVE)


 


Urine Urobilinogen


 


 


 0.2 mg/dL


(0.2-1.0)


 


Urine Leukocyte Esterase


 


 


 75 Paul/uL


(NEGATIVE)  H


 


Urine RBC


 


 


 0-1 /HPF (0-1)





 


Urine WBC


 


 


 2-5 /HPF (0-1)


H


 


Urine Squamous Epithelial


Cells 


 


 FEW /HPF (0-2)





 


Urine Bacteria


 


 


 RARE /HPF


(None Seen)








Labs Reviewed?:  Yes





EKG/XRAY/US/CT/MRI


EKG Comment


2025 time 2:15 p.m.


Ventricular rate 97


Atrial fibrillation


No ST wave elevation or depression


X-RAY Comment


[address] [address] [phone]





IMAGING REPORT


Signed





PATIENT: RALPH GUERRA        MR#: [id number]


ACCOUNT#: [id number]        : 1950


SEX: F AGE: 74        LOCATION: [facility]


ORDER DT: 25 1411        STATUS: REG ER


ACCESSION#: [id number]        REPORT#: [id number]


SERVICE DT: 25 1352





REASON: sob


ORDERING PHYSICIAN: ARSEN CHURCHILL MD


PROCEDURE: CXR1VW  -  CHEST 1VW





PORTABLE CHEST RADIOGRAPH





INDICATION: sob





COMPARISON: 12/15/2023





FINDINGS: Cardiac monitor leads overlie the field of view. 





Heart remains enlarged. The pulmonary vascularity and criss appear


normal. 





No abnormal pulmonary parenchymal opacity or consolidation identified. 





No significant pleural effusion noted. No pneumothorax detected. 








IMPRESSION:





Cardiomegaly without radiographic evidence for any acute cardiopulmonary


process. 











DICTATED BY: JOSELIN DIAZ MD


DATE: 25 1450





ELECTRONICALLY SIGNED BY: JOSELIN DIAZ MD


DATE: 25 1453





[facility]


MDM: 


Differential diagnosis:  COVID, flu, strep, AFib, CHF exacerbation


Patient is a 74-year-old female coming in to be evaluated for URI symptoms.  Per

patient's symptoms began a couple of days ago.  Patient was found to have 

positive strep and positive influenza A.  Patient was hydrated with IV fluids 

magnesium was replaced.  Patient will be discharged in stable condition.


ED Course





Orders








Procedure Category Date Status





  Time 


 


Cbc With Differential LAB 3/2/25 Complete





  13:40 


 


Influenza Type A & B, LAB 3/2/25 Complete





Rapid  13:47 


 


Covid Rna Naat LAB 3/2/25 Complete





  13:47 


 


Rapid (Group A Strep) LAB 3/2/25 Complete





  13:47 


 


Prothrombin Time With LAB 3/2/25 Complete





INR  13:52 


 


B-Type Natriuretic LAB 3/2/25 Complete





Peptide  13:52 


 


Chest 1vw RAD 3/2/25 Resulted





  13:52 


 


12 Lead Ekg Tracing- EKG 3/2/25 Resulted





Technical  13:52 


 


Magnesium LAB 3/2/25 Complete





  13:52 


 


Creatine Kinase, Total LAB 3/2/25 Complete





  13:52 


 


Troponin I High LAB 3/2/25 Complete





Sensitivity  13:52 


 


Urinalysis Profile LAB 3/2/25 Complete





  13:52 


 


Partial LAB 3/2/25 Complete





Thromboplastin Time  13:52 


 


Basic Metabolic Panel LAB 3/2/25 Complete





  13:52 


 


0.9%Nacl 1000ml (Ns PHA 3/2/25 Complete





1000ml)  15:00 


 


Magnesium 2gm Premix PHA 3/2/25 Complete





50ml (Magnesium 2gm  14:58 


 


Dexamethasone 4mg/Ml PHA 3/2/25 Complete





1ml Vial (Dexametha  15:00 


 


Culture Urine HUDSON 3/2/25 In Process





  16:19 








Current Medications








 Medications


  (Trade)  Dose


 Ordered  Sig/Marin


 Route


 PRN Reason  Start Time


 Stop Time Status Last Admin


Dose Admin


 


 Dexamethasone


 Sodium Phosphate


  (dexaMETHasone


 4MG/ML 1ML VIAL)  6 mg  ONCE  ONCE


 IVP


   3/2/25 15:00


 3/2/25 15:04 DC 3/2/25 15:06





 


 Magnesium Sulfate  50 ml @ 0


 mls/hr  PROTOCOL  STAT


 IV


   3/2/25 14:58


 3/2/25 15:04 DC 3/2/25 15:06





 


 Sodium Chloride  1,000 ml @ 


 0 mls/hr  ONCE  ONCE


 IV


   3/2/25 15:00


 3/2/25 15:04 DC 3/2/25 15:06











Vital Signs








  Date Time  Temp Pulse Resp B/P (MAP) Pulse Ox O2 Delivery O2 Flow Rate FiO2


 


3/2/25 15:13 98.1 101 18 127/87 98 Room Air* 0 21


 


3/2/25 13:56 99.0 105 20 132/86 98 Room Air* 0 21


 


3/2/25 13:38 99.1 105 15 129/72 98 Room Air 0 











DX & DISP


Disposition:  Discharge


Departure


Impression:  


   Primary Impression:  Hypomagnesemia


   Additional Impressions:  Strep pharyngitis, Influenza A


Condition:  Stable


Scripts


Oseltamivir Phosphate (Tamiflu) 75 Mg Cap


1 CAP PO BID for 5 Days, #10 CAP 0 Refills


   Prov: ARSEN CHURCHILL MD         3/2/25 


Amoxicillin (Amoxicillin) 500 Mg Capsule


1 CAP PO TID for 10 Days, #30 CAP 0 Refills


   Prov: ARSEN CHURCHILL MD         3/2/25





Additional Instructions:  


FOLLOW-UP WITH PRIMARY CARE PROVIDER IN 1 TO 2 DAYS.  TAKE MEDICATIONS AS 

DIRECTED HERE IN THE EMERGENCY ROOM.  OKAY TO CONTINUE HOME MEDICATIONS UNLESS 

OTHERWISE DISCUSSED DURING YOUR VISIT IN THE EMERGENCY ROOM TODAY.  RETURN TO 

YOUR NEAREST EMERGENCY ROOM IF SYMPTOMS WORSEN OR IF THERE IS NO IMPROVEMENT.  

CALL 911 IF YOU NEED IMMEDIATE ASSISTANCE.  TAKE TYLENOL  OVER-THE-COUNTER AS 

NEEDED AND IF NO CONTRAINDICATIONS ARE PRESENT.  INCREASE ORAL HYDRATION.  A 

WOUND CULTURE OR URINE CULTURE WAS ORDERED HERE IN THE EMERGENCY ROOM DEPARTMENT

PLEASE FOLLOW-UP WITH PRIMARY CARE PROVIDER AND ADVISE THEM TO GET REPEAT PORTS 

FROM OUR FACILITY.  IF YOU HAD ANY ACE WRAP/SPLINTS THAT WERE APPLIED HERE, 

PLEASE DO NOT REMOVE THEM UNTIL YOU SEE YOUR PRIMARY CARE OR SPECIALTY.





Referrals:


Referrals:  


LEROY LICEA MD (PCP)


Time of Disposition:  15:19











ARSEN CHURCHILL MD               Mar 2, 2025 15:20

## 2025-03-30 ENCOUNTER — HOSPITAL ENCOUNTER (EMERGENCY)
Dept: HOSPITAL 90 - EDH | Age: 75
Discharge: HOME | End: 2025-03-30
Payer: COMMERCIAL

## 2025-03-30 VITALS
DIASTOLIC BLOOD PRESSURE: 81 MMHG | SYSTOLIC BLOOD PRESSURE: 108 MMHG | RESPIRATION RATE: 18 BRPM | OXYGEN SATURATION: 98 % | HEART RATE: 56 BPM | TEMPERATURE: 99 F

## 2025-03-30 VITALS — HEIGHT: 61 IN | BODY MASS INDEX: 33.96 KG/M2 | WEIGHT: 179.9 LBS

## 2025-03-30 DIAGNOSIS — Z79.01: ICD-10-CM

## 2025-03-30 DIAGNOSIS — E03.9: ICD-10-CM

## 2025-03-30 DIAGNOSIS — Z79.899: ICD-10-CM

## 2025-03-30 DIAGNOSIS — Z90.49: ICD-10-CM

## 2025-03-30 DIAGNOSIS — I48.91: ICD-10-CM

## 2025-03-30 DIAGNOSIS — I10: ICD-10-CM

## 2025-03-30 DIAGNOSIS — R10.12: Primary | ICD-10-CM

## 2025-03-30 LAB
BASOPHILS # BLD AUTO: 0.03 K/UL (ref 0–0.2)
BASOPHILS NFR BLD AUTO: 0.3 % (ref 0–5)
BUN SERPL-MCNC: 12 MG/DL (ref 7–18)
CHLORIDE SERPL-SCNC: 108 MMOL/L (ref 101–111)
CO2 SERPL-SCNC: 25 MMOL/L (ref 21–32)
CREAT SERPL-MCNC: 0.6 MG/DL (ref 0.5–1)
EOSINOPHIL # BLD AUTO: 0.15 K/UL (ref 0–0.7)
EOSINOPHIL NFR BLD AUTO: 1.3 % (ref 0–8)
ERYTHROCYTE [DISTWIDTH] IN BLOOD BY AUTOMATED COUNT: 13.6 % (ref 11–15.5)
GFR SERPL CREATININE-BSD FRML MDRD: 95 ML/MIN (ref 90–?)
GLUCOSE SERPL-MCNC: 133 MG/DL (ref 70–105)
HCT VFR BLD AUTO: 45.4 % (ref 36–48)
IMM GRANULOCYTES # BLD: 0.08 K/UL (ref 0–1)
LYMPHOCYTES # SPEC AUTO: 2.4 K/UL (ref 1–4.8)
LYMPHOCYTES NFR SPEC AUTO: 20.5 % (ref 21–51)
MCH RBC QN AUTO: 32.2 PG (ref 27–33)
MCHC RBC AUTO-ENTMCNC: 32.6 G/DL (ref 32–36)
MCV RBC AUTO: 98.9 FL (ref 79–99)
MONOCYTES # BLD AUTO: 0.8 K/UL (ref 0.1–1)
MONOCYTES NFR BLD AUTO: 7 % (ref 3–13)
NEUTROPHILS # BLD AUTO: 8.2 K/UL (ref 1.8–7.7)
NEUTROPHILS NFR BLD AUTO: 70.2 % (ref 40–77)
NRBC BLD MANUAL-RTO: 0 % (ref 0–0.19)
PLATELET # BLD AUTO: 237 K/UL (ref 130–400)
POTASSIUM SERPL-SCNC: 4.1 MMOL/L (ref 3.5–5.1)
RBC # BLD AUTO: 4.59 MIL/UL (ref 4–5.5)
SODIUM SERPL-SCNC: 143 MMOL/L (ref 136–145)
WBC # BLD AUTO: 11.7 K/UL (ref 4.8–10.8)

## 2025-03-30 PROCEDURE — 80048 BASIC METABOLIC PNL TOTAL CA: CPT

## 2025-03-30 PROCEDURE — 93005 ELECTROCARDIOGRAM TRACING: CPT

## 2025-03-30 PROCEDURE — 36415 COLL VENOUS BLD VENIPUNCTURE: CPT

## 2025-03-30 PROCEDURE — 85025 COMPLETE CBC W/AUTO DIFF WBC: CPT

## 2025-03-30 PROCEDURE — 83690 ASSAY OF LIPASE: CPT

## 2025-03-30 PROCEDURE — 99285 EMERGENCY DEPT VISIT HI MDM: CPT

## 2025-03-30 PROCEDURE — 74018 RADEX ABDOMEN 1 VIEW: CPT

## 2025-03-30 RX ADMIN — DEXAMETHASONE INTENSOL ONE ML: 1 SOLUTION, CONCENTRATE ORAL at 06:04

## 2025-03-30 RX ADMIN — DICYCLOMINE HYDROCHLORIDE ONE MG: 10 SOLUTION ORAL at 06:04

## 2025-03-30 RX ADMIN — ALUMINUM HYDROXIDE, MAGNESIUM HYDROXIDE, AND DIMETHICONE ONE ML: 400; 400; 40 SUSPENSION ORAL at 06:04

## 2025-03-30 NOTE — ERN
General


Chief Complaint:  Abdominal Pain


Stated Complaint:  ABD PAIN


Time Seen by MD:  03:40


Source:  patient





History of Present Illness


Initial Comments


Patient woke up with the abdominal pain in her left mid/upper quadrant and 

around her umbilicus.  No fevers no chills no emesis no vomiting no nausea.  She

did have bout of diarrhea when she came to the emergency room.  No other c

onstitutional symptoms.  She has had her gallbladder taken out open surgery 

technique and there was a large mature scar on her right abdomen.


Allergies:  


Coded Allergies:  


     No Known Drug Allergies (Unverified  Allergy, Unknown, 2/9/18)


Home Meds


Active Scripts


Oseltamivir Phosphate (Tamiflu) 75 Mg Cap, 1 CAP PO BID for 5 Days, #10 CAP 0 

Refills


   Prov:ARSEN CHURCHILL MD         3/2/25


Amoxicillin (Amoxicillin) 500 Mg Capsule, 1 CAP PO TID for 10 Days, #30 CAP 0 

Refills


   Prov:ARSEN CHURCHILL MD         3/2/25


Albuterol Sulfate (Ventolin Hfa/Proventil Hfa/Proair Hfa) 90 Mcg Puff, 1-2 PUFF 

IH Q4H PRN for SHORTNESS OF BREATH for 5 Days, #1 INH 0 Refills


   PHARMACY TO DISPENSE 1 INHALER FOR USE


   Prov:JAQUAN RICO MD         10/22/23


Reported Medications


Metoprolol Succinate (Metoprolol Succinate) 25 Mg Tab.er.24h, 25 MG PO TID, TAB


   4/15/24


Apixaban (Eliquis) 5 Mg Tablet, 5 MG PO BID, TAB


   4/15/24


Digoxin (Digoxin) 0.125/2.5 Solution, 0.125 MG PO DAILY, ML


   4/15/24


Montelukast Sodium (Montelukast Sodium) 10 Mg Tablet, 10 MG PO DAILY, TAB


   4/15/24


Magnesium Oxide (Magnesium Oxide) 400 Mg Magnesium Tablet, 400 MG PO BID, TAB


   4/15/24


Diclofenac Sodium (Diclofenac Sodium) 75 Mg Tablet.dr, 75 MG PO BIDAC, TAB


   4/15/24


Desloratadine (Desloratadine) 5 Mg Tab.rapdis, 5 MG PO DAILY, TAB


   4/15/24


Potassium Chloride (Potassium Chloride) 10 Meq Tab.er.prt, 10 MEQ PO BID


   4/15/24


Tizanidine HCl (Tizanidine HCl) 2 Mg Tablet, 2 MG PO Q8HPRN, TAB


   4/15/24


Losartan Potassium (Losartan Potassium) 25 Mg Tablet, 25 MG PO TID, TAB


   4/15/24


Levothyroxine Sodium (Levothyroxine Sodium) 88 Mcg Tablet, 88 MCG PO DAILYBKFST,

TAB


   4/15/24


Pantoprazole Sodium (Pantoprazole Sodium) 40 Mg Tablet.dr, 1 TAB PO DAILY


   10/25/23


Tramadol HCl/Acetaminophen (Tramadol-Acetaminophn 37.5-325) 37.5 Mg-325 Mg 

Tablet, 1 TAB PO Q8HPRN PRN for PAIN LEVEL 1 TO 5


   10/25/23





Past Medical History


Past Medical History:  A-Fib, Hypertension, Hypothyroid


Medical History Other:  THYROID, HEADACHES


Past Surgical History:  Cholecystectomy





Family History


Family History:  CAD, DM, HTN





Social History


Social History:  Negative, Lives with family, Other





Female(pregnancy History)


Pregnancy History:  Not Applicable





ROS Dictation


Review of systems are negative patient is alert and oriented, she drove herself 

to the hospital.  She is ambulating through the emergency room.  No distress.  

No change in vision no mental status changes no chest pain no shortness of 

breath urination is fine.





Physical Exam


General Appearance:  (+) no apparent distress


Orientation:  (+) oriented x 3


Eye:  bilateral eye normal inspection, bilateral eye PERRL, bilateral eye EOMI


Ear, Nose, Throat:  (+) hearing grossly normal, (+) normal ENT inspection, (+) 

moist mucous membraine


Neck:  (+) normal inspection, (+) supple


Respiratory:  (+) chest non-tender, (+) lungs clear


Heart:  (+) regular


Vascular:  (+) no edema, (+) no JVD


Gastrointestinal:  (+) soft


Gastrointestinal Comment


She has hyperactive bowel sounds





Results


Laboratory and Microbiology


Lab and Micro Result





Laboratory Tests








Test


 3/30/25


03:10


 


White Blood Count


 11.7 K/uL


(4.8-10.8)  H


 


Red Blood Count


 4.59 MIL/uL


(4.00-5.50)


 


Hemoglobin


 14.8 g/dL


(12.0-16.0)


 


Hematocrit


 45.4 % (36-48)





 


Mean Corpuscular Volume


 98.9 fL


(79-99)


 


Mean Corpuscular Hemoglobin


 32.2 pg


(27.0-33.0)


 


Mean Corpuscular Hemoglobin


Concent 32.6 g/dL


(32.0-36.0)


 


Red Cell Distribution Width


 13.6 %


(11.0-15.5)


 


Platelet Count


 237 K/uL


(130-400)


 


Mean Platelet Volume


 9.8 fL


(7.5-10.5)


 


Immature Granulocyte % (Auto) 0.7 % (0-1)  


 


Neutrophils (%) (Auto)


 70.2 %


(40.0-77.0)


 


Lymphocytes (%) (Auto)


 20.5 %


(21.0-51.0)  L


 


Monocytes (%) (Auto)


 7.0 %


(3.0-13.0)


 


Eosinophils (%) (Auto)


 1.3 %


(0.0-8.0)


 


Basophils (%) (Auto)


 0.3 %


(0.0-5.0)


 


Neutrophils # (Auto)


 8.2 K/uL


(1.8-7.7)  H


 


Lymphocytes # (Auto)


 2.4 K/uL


(1.0-4.8)


 


Monocytes # (Auto)


 0.8 K/uL


(0.1-1.0)


 


Eosinophils # (Auto)


 0.15 K/uL


(0.00-0.70)


 


Basophils # (Auto)


 0.03 K/uL


(0.00-0.20)


 


Absolute Immature Granulocyte


(auto 0.08 K/uL


(0-1)


 


Nucleated Red Blood Cells


 0.0 %


(0.0-0.19)


 


Sodium Level


 143 mmol/L


(136-145)


 


Potassium Level


 4.1 mmol/L


(3.5-5.1)


 


Chloride Level


 108 mmol/L


(101-111)


 


Carbon Dioxide Level


 25 mmol/L


(21-32)


 


Blood Urea Nitrogen


 12 mg/dL


(7-18)


 


Creatinine


 0.6 mg/dL


(0.5-1.0)


 


Glomerular Filtration Rate


Calc 95 mL/min


(>90)


 


Random Glucose


 133 mg/dL


()  H


 


Total Calcium


 9.0 mg/dL


(8.5-10.1)


 


Lipase


 21 U/L (16-77)














MDM


I will get a CBC and a BMP and a KUB.  A UA has already been ordered.  Patient 

is extremely nontoxic in appearance.  Further workup as lab results dictate.





Patient's KUB is unremarkable.  Chemistry panel is unremarkable.  CBC panel has 

a slight elevation in her WBCs but is otherwise normal.  I do think this is a 

case of gastroenteritis.  There does not seem to be anything in her lab set was 

suggest a more complicated diagnosis and her demeanor is extremely calm with 

minimal pain.





 Patient states that her stomach is just gurgling a lot right now.  I asked her 

if she is passing gas and she said she has been.  She is happy to go home.  I 

counseled her to return if her symptoms get worse or if she starts having nausea

an emesis with worsening abdominal pain.


ED Course





Orders








Procedure Category Date Status





  Time 


 


Vital Signs Per CPOE 3/30/25 Transmitted





Routine  03:07 


 


Saline Lock Iv CPOE 3/30/25 Transmitted





  03:07 


 


Cbc With Differential LAB 3/30/25 Complete





  03:07 


 


Lipase LAB 3/30/25 Complete





  03:07 


 


Urinalysis Profile LAB 3/30/25 Logged





  03:07 


 


12 Lead Ekg Tracing- EKG 3/30/25 Complete





Technical  03:07 


 


Basic Metabolic Panel LAB 3/30/25 Complete





  03:07 


 


Cbc Without LAB 3/30/25 Logged





Differential  04:00 


 


Abd 1vw RAD 3/30/25 Taken





  04:00 








Vital Signs








  Date Time  Temp Pulse Resp B/P (MAP) Pulse Ox O2 Delivery O2 Flow Rate FiO2


 


3/30/25 03:20 98.1 91 18 120/69 95 Room Air* 0 21


 


3/30/25 03:04 97.5 100 20 109/75 98 Room Air  











DX & DISP


Disposition:  Discharge


Departure


Impression:  


   Primary Impression:  Abdominal pain


Condition:  Stable





Additional Instructions:  


Please return if there is intractable nausea and vomiting or if your abdominal 

pain gets worse.


Referrals:  


LEROY LICEA MD (PCP)











ALEX BARBOSA MD           Mar 30, 2025 04:05

## 2025-03-30 NOTE — EKG
[facility]

                                       

Test Date:    2025               Test Time:    03:23:37

Pat Name:     RALPH GUERRA           Department:   ED

Patient ID:   [id number]           Room:          

Gender:       F                        Technician:   [id number]

:          1950               Requested By: ALEX BARBOSA

Order Number: [id number]        Reading MD:   Berlin Aquino

                                 Measurements

Intervals                              Axis          

Rate:         86                       P:            0

KS:           0                        QRS:          -40

QRSD:         99                       T:            42

QT:           322                                    

QTc:          385                                    

                           Interpretive Statements

Atrial fibrillation

Poor R wave progression

Compared to ECG 2025 14:15:55

No significant changes

Electronically Signed On 2025 14:57:29 CDT by Berlin Aquino



Please click the below link to view image of tracing.

## 2025-03-30 NOTE — [facility]
PELVIS RADIOGRAPH (1 VIEW)



INDICATION: Pain



COMPARISON: None



FINDINGS:



No evidence for acute fracture or dislocation.



Sacroiliac joints appear normal.



Both hip joints appear normal.



IMPRESSION:



No radiographic evidence for fracture or dislocation.